# Patient Record
Sex: MALE | Race: WHITE | NOT HISPANIC OR LATINO | Employment: OTHER | URBAN - METROPOLITAN AREA
[De-identification: names, ages, dates, MRNs, and addresses within clinical notes are randomized per-mention and may not be internally consistent; named-entity substitution may affect disease eponyms.]

---

## 2022-04-18 ENCOUNTER — TELEPHONE (OUTPATIENT)
Dept: NEPHROLOGY | Facility: CLINIC | Age: 62
End: 2022-04-18
Payer: MEDICARE

## 2022-04-18 NOTE — TELEPHONE ENCOUNTER
Tried to call pt but no answer. I left  a detailed voicemail that the Lokelma was being sent into his pharmacy instead of Valtessa. And also left details that it will be taken on M-W-F, and if any questions to call the office.       ----- Message from VLADIMIR Flaherty sent at 4/18/2022  3:11 PM CDT -----  Contact: Chago  I sent another Worlize message.      Please let him know that I sent in Lokelma. (Just a different drug that does the same thing) to take M-W-F; Veltassa wasn't on his formulary.  Lokelma was.     Rosie   ----- Message -----  From: Suzi Bass MA  Sent: 4/14/2022  12:42 PM CDT  To: VLADIMIR Flaherty    The denial is on your clipboard  ----- Message -----  From: Jenifer Casillas  Sent: 4/14/2022  10:08 AM CDT  To: Thierry Aldridge Staff    Type: Needs Medical Advice    Who Called: [facility]  Best Call Back Number: [phone]  Additional  Information: Would like a call back to discuss  pt  Deanna,  medication being denied. Wants to know if  will submit an appeal or prescribe an alternate medication  Please Advise- Thank you

## 2022-04-18 NOTE — PROGRESS NOTES
Received denial from Insurance company for Veltassa, not on formulary, have sent in [facility] to take M-W-F.  Please notify the patient and let him know.

## 2022-04-22 ENCOUNTER — TELEPHONE (OUTPATIENT)
Dept: NEPHROLOGY | Facility: CLINIC | Age: 62
End: 2022-04-22
Payer: MEDICARE

## 2022-04-22 NOTE — TELEPHONE ENCOUNTER
Spoke to the pharmacy , LIANE OchoaP had sent a new RX for Lukelma instead of Valtessa. Per the pharmacy the pt picked the Lukelma up yesterday.     ----- Message from Tammi Cazares sent at 4/20/2022 10:47 AM CDT -----  Contact: pharm  Type: Needs Medical Advice         Who Called: sal Caldwell Call Back Number: [phone]  Additional Information: Requesting a call back regarding  the Valtessa was denied by the insurance and a PA was denied and they are wondering if a appeal was done or if something else will be called in.     Ref# [id number]    [id number] - [place], [place] - [address] AT [address]  [address] [place] [address]      Please Advise- Thank you

## 2022-06-09 RX ORDER — DAPAGLIFLOZIN 10 MG/1
1 TABLET, FILM COATED ORAL DAILY
COMMUNITY
Start: 2022-05-18 | End: 2022-10-20

## 2022-06-09 RX ORDER — EZETIMIBE 10 MG/1
0.5 TABLET ORAL DAILY
COMMUNITY
Start: 2022-03-07

## 2022-06-09 RX ORDER — ESOMEPRAZOLE MAGNESIUM 40 MG/1
40 CAPSULE, DELAYED RELEASE ORAL DAILY PRN
COMMUNITY
End: 2023-06-09

## 2022-06-09 RX ORDER — HYDROCHLOROTHIAZIDE 12.5 MG/1
12.5 TABLET ORAL DAILY
COMMUNITY
Start: 2022-02-12 | End: 2023-09-27

## 2022-06-09 RX ORDER — CLOPIDOGREL BISULFATE 75 MG/1
75 TABLET ORAL DAILY
COMMUNITY
Start: 2022-02-12

## 2022-06-09 RX ORDER — FLASH GLUCOSE SENSOR
KIT MISCELLANEOUS
COMMUNITY
Start: 2022-04-06 | End: 2022-06-16

## 2022-06-09 RX ORDER — INSULIN ASPART 100 [IU]/ML
INJECTION, SOLUTION INTRAVENOUS; SUBCUTANEOUS
COMMUNITY
Start: 2022-03-04 | End: 2022-06-16

## 2022-06-15 PROBLEM — I10 ESSENTIAL HYPERTENSION: Status: ACTIVE | Noted: 2022-06-15

## 2022-06-15 PROBLEM — E87.5 HYPERKALEMIA: Status: ACTIVE | Noted: 2022-06-15

## 2022-06-15 PROBLEM — E10.22 TYPE 1 DIABETES MELLITUS WITH STAGE 4 CHRONIC KIDNEY DISEASE: Status: ACTIVE | Noted: 2022-06-15

## 2022-06-15 PROBLEM — E78.2 HYPERLIPIDEMIA, MIXED: Status: ACTIVE | Noted: 2022-06-15

## 2022-06-15 PROBLEM — N18.4 TYPE 1 DIABETES MELLITUS WITH STAGE 4 CHRONIC KIDNEY DISEASE: Status: ACTIVE | Noted: 2022-06-15

## 2022-06-15 PROBLEM — N18.4 CHRONIC KIDNEY DISEASE, STAGE IV (SEVERE): Status: ACTIVE | Noted: 2022-06-15

## 2022-06-15 PROBLEM — E83.39 HYPERPHOSPHATEMIA: Status: ACTIVE | Noted: 2022-06-15

## 2022-06-15 PROBLEM — R80.1 PERSISTENT PROTEINURIA: Status: ACTIVE | Noted: 2022-06-15

## 2022-06-15 NOTE — PROGRESS NOTES
Pt Name:  Boston Strickland  Pt :  1960  Pt MRN:  [id number]    Date: 2022    Reason for visit:   Boston Strickland is a 61 y.o. c male presenting for CKD follow up  with serum creatinine 2.51, eGFR 26 and Chronic Kidney Disease Stage 4.     Chief Complaint:   The patient denies any complaints today.    HPI:  The patient is being seen today for follow up CKD and the status of his kidney function. Since the last visit, patient reports no health changes.  The patient denies fatigue, weakness, poor appetite, metallic taste, nausea, cramping, chest pain, palpitations, SOB, orthopnea, PND, edema, trouble concentrating, decreased urination.      Home BPs when checked are <130 - 140/80 per patient. The patient is following a low sodium diet. The patient is avoiding NSAIDs. The patient is drinking 64 oz of water daily.     Since last visit on 3/3/22 patient reports no changes in medical history.      History:   Past Medical History:   Diagnosis Date    Chronic kidney disease, unspecified     Coronary artery disease     Diverticulosis     DM type 1 (diabetes mellitus, type 1)     External hemorrhoids     GERD (gastroesophageal reflux disease)     HTN (hypertension)     Mixed hyperlipidemia     Personal history of colonic polyps     ST elevation (STEMI) myocardial infarction of unspecified site     Unspecified cataract     Unspecified hemorrhoids      History reviewed. No pertinent surgical history.  Family History   Problem Relation Age of Onset    Hypertension Mother     Hypertension Father     Cancer Sister     Cancer Paternal Aunt      Social History     Substance and Sexual Activity   Alcohol Use Yes    Alcohol/week: 1.0 standard drink    Types: 1 Glasses of wine per week    Comment: occational 1-2 weekly     Social History     Substance and Sexual Activity   Drug Use Never     Social History     Substance and Sexual Activity   Sexual Activity Yes    Partners: Female     reports being sexually active  and has had partner(s) who are female.  Social History     Tobacco Use   Smoking Status Former Smoker   Smokeless Tobacco Never Used       Allergies:  Review of patient's allergies indicates:  No Known Allergies      Current Outpatient Medications:     amLODIPine (NORVASC) 10 MG tablet, Take 10 mg by mouth once daily at 6am., Disp: , Rfl:     aspirin (ECOTRIN) 81 MG EC tablet, Take 81 mg by mouth once daily., Disp: , Rfl:     clopidogreL (PLAVIX) 75 mg tablet, Take 75 mg by mouth once daily., Disp: , Rfl:     dapagliflozin (FARXIGA) 10 mg tablet, Take 1 tablet by mouth once daily., Disp: , Rfl:     esomeprazole (NEXIUM) 40 MG capsule, Take 40 mg by mouth daily as needed., Disp: , Rfl:     ezetimibe (ZETIA) 10 mg tablet, Take 10 mg by mouth once daily., Disp: , Rfl:     HUMALOG U-100 INSULIN 100 unit/mL injection, , Disp: , Rfl:     hydroCHLOROthiazide (HYDRODIURIL) 12.5 MG Tab, Take 12.5 mg by mouth once daily., Disp: , Rfl:     lisinopriL (PRINIVIL,ZESTRIL) 20 MG tablet, Take 20 mg by mouth once daily., Disp: , Rfl:     metoprolol succinate (TOPROL-XL) 50 MG 24 hr tablet, Take 50 mg by mouth once daily., Disp: , Rfl:     multivitamin (ONE DAILY MULTIVITAMIN) per tablet, Take 1 tablet by mouth once daily., Disp: , Rfl:     rosuvastatin (CRESTOR) 20 MG tablet, Take 20 mg by mouth once daily., Disp: , Rfl:     cholecalciferol, vitamin D3, 125 mcg (5,000 unit) Tab, Take 1 tablet (5,000 Units total) by mouth every other day., Disp: 30 tablet, Rfl: 11    sodium zirconium cyclosilicate (LOKELMA) 10 gram packet, Take 1 packet (10 g total) by mouth once daily. Mix entire contents of packet(s) into drinking glass containing 3 tablespoons of water; stir well and drink immediately. Add water and repeat until no powder remains to receive entire dose., Disp: 30 packet, Rfl: 11    ROS:  Review of Systems   Constitutional: Negative for activity change, appetite change and fatigue.   Eyes: Negative for visual  "disturbance.   Respiratory: Negative for shortness of breath.    Cardiovascular: Negative for chest pain, palpitations and leg swelling.   Gastrointestinal: Negative for abdominal pain, constipation, nausea and vomiting.   Genitourinary: Negative for decreased urine volume, difficulty urinating, dysuria, flank pain, frequency and urgency.   Musculoskeletal: Negative for back pain and joint swelling.   Skin: Negative for color change and rash.   Neurological: Negative for dizziness, weakness, light-headedness and headaches.       Physical Exam:   Vitals:   Vitals:    06/16/22 1035   BP: 130/73   Pulse: 66   Temp: 98.1 °F (36.7 °C)   SpO2: 98%   Weight: 86.4 kg (190 lb 6.4 oz)   Height: 5' 9" (1.753 m)   PainSc: 0-No pain     Body mass index is 28.12 kg/m².    Physical Exam  Vitals reviewed.   Constitutional:       General: He is awake. He is not in acute distress.  HENT:      Head: Normocephalic.      Mouth/Throat:      Mouth: Mucous membranes are moist.   Eyes:      General: No scleral icterus.     Conjunctiva/sclera: Conjunctivae normal.      Pupils: Pupils are equal, round, and reactive to light.   Cardiovascular:      Rate and Rhythm: Normal rate and regular rhythm.      Heart sounds: Normal heart sounds, S1 normal and S2 normal. No murmur heard.  Pulmonary:      Effort: Pulmonary effort is normal.      Breath sounds: Normal breath sounds. No wheezing, rhonchi or rales.   Abdominal:      General: There is no distension.      Palpations: Abdomen is soft. There is no mass.   Musculoskeletal:         General: No swelling.      Right lower leg: No edema.      Left lower leg: No edema.   Skin:     General: Skin is warm and dry.      Nails: There is no clubbing.   Neurological:      General: No focal deficit present.      Mental Status: He is alert and oriented to person, place, and time.   Psychiatric:         Mood and Affect: Mood and affect normal.         Thought Content: Thought content normal.         Labs/Tests " 6/14/22:   Component Ref Range & Units 1 d ago   (6/14/22) 2 mo ago   (3/23/22) 3 mo ago   (3/1/22) 7 mo ago   (10/25/21) 7 mo ago   (10/25/21) 8 mo ago   (9/29/21)   Sodium 136 - 147 mmol/L 139  142  139  136  136  140    Potassium 3.5 - 5.1 mmol/L 5.2 High   5.3 High   5.4 High   5.6 High   5.6 High   5.4 High     Chloride 98 - 107 mmol/L 103  107  105  102  102  107    CO2 20 - 31 mmol/L 27  25  28  24  24  28    Glucose 70 - 99 mg/dL 173 High   187 High   137 High   234 High   234 High   127 High     BUN 9 - 23 mg/dL 51 High   54 High   50 High   35 High   35 High   36 High     Creatinine 0.70 - 1.30 mg/dL 2.51 High   2.32 High   2.28 High   1.94 High   1.94 High   1.94 High     Calcium 8.3 - 10.6 mg/dL 10.4  9.4  10.1  9.5  9.5  9.3    Phosphorus Level 2.4 - 5.1 mg/dL 5.2 High    4.7  3.9   4.4    Albumin Level 3.2 - 4.8 g/dL 4.4  4.1  4.5  4.1  4.1  4.1    eGFR >90 mL/min/1.73m2 26 Low   29 Low   30 Low   36 Low   36 Low   36 Low     eGFR  >90 mL/min/1.73m2 31 Low   34 Low   34 Low         Component Ref Range & Units 1 d ago   (6/14/22) 3 mo ago   (3/1/22) 3 mo ago   (3/1/22) 8 mo ago   (9/29/21) 10 mo ago   (7/21/21) 1 yr ago   (2/19/21) 1 yr ago   (11/13/20)   Protein, Urine mg/dL mg/dL 50   74  66 R  203 R   70 R    Creatinine, Urine mg/dL mg/dL 55.6  66.9  68.1  37.8 R  52.2 R  91.8 R  72.0 R    Urine Protein/Creatinine Ratio <0.2 mg of protein/mg of creatinine 0.9 High    1.1 High           Component Ref Range & Units 1 d ago 3 mo ago 8 mo ago 10 mo ago 2 yr ago   PTH, Intact 15 - 65 pg/mL 48  76 High   63  94 High       Component Ref Range & Units 1 d ago   (6/14/22) 3 mo ago   (3/1/22) 8 mo ago   (9/29/21) 10 mo ago   (7/21/21) 1 yr ago   (5/14/21) 1 yr ago   (11/13/20) 1 yr ago   (11/13/20)   WBC 4.4 - 10.1 K/UL 7.1  7.5  7.2   6.1  7.4  7.4    RBC 4.04 - 5.41 M/UL 4.77  4.72  4.09   4.25  4.52  4.52    Hemoglobin 11.9 - 16.8 g/dL 12.7  12.4  10.5 Low   9.9 Low   11.2 Low   12.1  12.1     Hct 36.3 - 50.6 % 39.9  39.9  34.0 Low    35.7 Low   38.1  38.1    MCV 83.3 - 100.5 fL 83.6  84.5  83.1 Low    84.0  84.3  84.3    MCH 27.6 - 33.5 pg 26.6 Low   26.3 Low   25.7 Low    26.4 Low   26.8 Low   26.8 Low     MCHC 31.2 - 34.9 g/dL 31.8  31.1 Low   30.9 Low    31.4  31.8  31.8    RDW 38.5 - 54.4 fL 45.2  48.4  46.5   44.8  46.3  46.3    Platelets 117 - 369 K/  298  271            3/23/22:    Component   Ref Range & Units 2 mo ago   (3/23/22) 7 mo ago   (10/25/21) 1 yr ago   (5/14/21)    Hemoglobin A1C   <5.7 % 8.7 High   8.6 High  CM  7.4 High             Diagnosis:  1. Type 1 diabetes mellitus with stage 4 chronic kidney disease  Renal Function Panel    Renal Function Panel   2. Essential hypertension  Renal Function Panel    Renal Function Panel   3. Persistent proteinuria  Protein/Creatinine Ratio, Urine    Protein/Creatinine Ratio, Urine   4. Hyperlipidemia, mixed  Renal Function Panel    Renal Function Panel   5. Hyperkalemia  Renal Function Panel    Renal Function Panel   6. Hyperphosphatemia  Vitamin D    Vitamin D   7. Chronic kidney disease, stage IV (severe)  Hemoglobin    Renal Function Panel    PTH, Intact    Protein/Creatinine Ratio, Urine    Vitamin D    Hemoglobin    Renal Function Panel    PTH, Intact    Protein/Creatinine Ratio, Urine    Vitamin D         Plan:  1. Type 1 diabetes mellitus with stage 4 chronic kidney disease - uncontrolled - HgbA1c 8.7% on 3/23/22: Has Insulin Pump - managed by Dr. Moody; Farxiga 10 mg po daily for approximately 1 month    2. Essential hypertension - at goal - (near goal at home 130 - 140/80) - Monitor BP, 2 Gram NA diet, Continue Lisinopril 20 mg po daily, Toprol XL 50 mg po daily, Norvasc 10 mg po daily, Hydrodiuril 12.5 mg po daily    3. Persistent proteinuria - 900 mg - down from 1100 mg - ACE / SGLT2   4. Hyperlipidemia, mixed - Continue Zocor 10 mg po nightly and Crestor 20 mg po nightly    5. Hyperkalemia - mild K 5.2 - increase Lokelma to 10  mg po daily from --    6. Hyperphosphatemia - mild Phos 5.2 - with Calcium of 10.4 - Will decrease Vitamin D 3 5000 units po to every other day.    7. Chronic kidney disease, stage IV (severe) - serum creatinine 2.51 / eGFR 26 - (last ) - Avoid NSAIDS, Assure 64 oz of water daily, Meds per #          My reconciliation of medication should not condone or support use of medications that have been previously prescribed for patients by other providers.  I am only documenting the current medications patients is taking and may change doses, add or discontinue medications based on information provided by the patient.     The patient has been provided with their current level of kidney function including eGFR and creatinine.    We discussed the potential for common complications of CKD including anemia, electrolyte abnormalities, abnormal fluid balance, mineral bone disease and malnutrition.    We discussed strategies to slow the progression of their kidney disease includin. Avoid nephrotoxic agents. Avoid over-the-counter and prescription NSAIDs (Ibuprofren, Motrin, Naproxyn, Aleve, Mobic, Celebrex, Toradol, Advil). All of these can worsen kidney function, elevate BP, cause fluid retention/swelling and elevate potassium. Avoid iodine contrast agents and gadolinium, which can worsen kidney function and/or cause kidney failure. Avoid phosphosoda bowel preps which can worsen kidney function.  2. Work to improve modifiable risk factors. Aim for good control of blood glucose without episodes of hypoglycemia. Notify the provider managing your diabetes if your blood glucose < 60. Aim for good blood pressure control without episodes of hypotension. Call the office if your systolic blood pressure is consistently < 110. Aim for good control of your cholesterol.  ? AIC goal <7.0  ? BP goal <130/80        Keeping these in goal range will help prevent progression of cardiovascular disease            and chronic kidney  disease.    We discussed dietary modifications:  ? Low sodium diet: 2 gm/d or less  ? Limit/avoid high potassium foods  ? Avoid potassium containing salt substitutes  ? Limit/avoid high phosphorus foods  ? Limit daily protein intake to 0.8-1 gm/kg of your ideal body weight.    We discussed lifestyle modifications:  ? Make sure you are drinking plenty of fluids--64 ounces (1/2 gallon) daily  ? Exercise at least 30 minutes 5 x per week (total 150 minutes per week), example brisk walking  ? Achieve and maintain a healthy weight (BMI 20-25)  ? Limit alcohol consumption to <2 drinks per day  ? Stop smoking  ? Make sure you stay current on important vaccines-- pneumonia vaccines (Pneumovax and Prevnar), flu vaccine, Hepatitis B (especially patients nearing renal replacement therapy and planning hemodialysis) and Covid-19 vaccine.     Recommendations:  1. Monitor your BP at home daily and record.  2. Bring readings to your next appt.  3. Call the office if your BP is persistently >130/80.  4. Seek urgent medical attention with signs and symptoms of uremia - extreme weakness, fatigue, confusion, anorexia, metallic taste in mouth, hiccoughs, cramping, itching, chest pain, swelling, or trouble sleeping.    Follow Up:   Follow up in about 3 months (around 9/16/2022).

## 2022-06-16 ENCOUNTER — OFFICE VISIT (OUTPATIENT)
Dept: NEPHROLOGY | Facility: CLINIC | Age: 62
End: 2022-06-16
Payer: MEDICARE

## 2022-06-16 VITALS
TEMPERATURE: 98 F | OXYGEN SATURATION: 98 % | HEIGHT: 69 IN | WEIGHT: 190.38 LBS | HEART RATE: 66 BPM | SYSTOLIC BLOOD PRESSURE: 130 MMHG | DIASTOLIC BLOOD PRESSURE: 73 MMHG | BODY MASS INDEX: 28.2 KG/M2

## 2022-06-16 DIAGNOSIS — E83.39 HYPERPHOSPHATEMIA: ICD-10-CM

## 2022-06-16 DIAGNOSIS — N18.4 CHRONIC KIDNEY DISEASE, STAGE IV (SEVERE): ICD-10-CM

## 2022-06-16 DIAGNOSIS — E87.5 HYPERKALEMIA: ICD-10-CM

## 2022-06-16 DIAGNOSIS — R80.1 PERSISTENT PROTEINURIA: ICD-10-CM

## 2022-06-16 DIAGNOSIS — E10.22 TYPE 1 DIABETES MELLITUS WITH STAGE 4 CHRONIC KIDNEY DISEASE: Primary | ICD-10-CM

## 2022-06-16 DIAGNOSIS — I10 ESSENTIAL HYPERTENSION: ICD-10-CM

## 2022-06-16 DIAGNOSIS — E78.2 HYPERLIPIDEMIA, MIXED: ICD-10-CM

## 2022-06-16 DIAGNOSIS — N18.4 TYPE 1 DIABETES MELLITUS WITH STAGE 4 CHRONIC KIDNEY DISEASE: Primary | ICD-10-CM

## 2022-06-16 PROCEDURE — 3052F PR MOST RECENT HEMOGLOBIN A1C LEVEL 8.0 - < 9.0%: ICD-10-PCS | Mod: CPTII,,, | Performed by: NURSE PRACTITIONER

## 2022-06-16 PROCEDURE — 3078F PR MOST RECENT DIASTOLIC BLOOD PRESSURE < 80 MM HG: ICD-10-PCS | Mod: CPTII,,, | Performed by: NURSE PRACTITIONER

## 2022-06-16 PROCEDURE — 3052F HG A1C>EQUAL 8.0%<EQUAL 9.0%: CPT | Mod: CPTII,,, | Performed by: NURSE PRACTITIONER

## 2022-06-16 PROCEDURE — 3008F BODY MASS INDEX DOCD: CPT | Mod: CPTII,,, | Performed by: NURSE PRACTITIONER

## 2022-06-16 PROCEDURE — 3078F DIAST BP <80 MM HG: CPT | Mod: CPTII,,, | Performed by: NURSE PRACTITIONER

## 2022-06-16 PROCEDURE — 4010F ACE/ARB THERAPY RXD/TAKEN: CPT | Mod: CPTII,,, | Performed by: NURSE PRACTITIONER

## 2022-06-16 PROCEDURE — 1159F PR MEDICATION LIST DOCUMENTED IN MEDICAL RECORD: ICD-10-PCS | Mod: CPTII,,, | Performed by: NURSE PRACTITIONER

## 2022-06-16 PROCEDURE — 99214 PR OFFICE/OUTPT VISIT, EST, LEVL IV, 30-39 MIN: ICD-10-PCS | Mod: ,,, | Performed by: NURSE PRACTITIONER

## 2022-06-16 PROCEDURE — 1160F RVW MEDS BY RX/DR IN RCRD: CPT | Mod: CPTII,,, | Performed by: NURSE PRACTITIONER

## 2022-06-16 PROCEDURE — 3066F NEPHROPATHY DOC TX: CPT | Mod: CPTII,,, | Performed by: NURSE PRACTITIONER

## 2022-06-16 PROCEDURE — 3075F SYST BP GE 130 - 139MM HG: CPT | Mod: CPTII,,, | Performed by: NURSE PRACTITIONER

## 2022-06-16 PROCEDURE — 3066F PR DOCUMENTATION OF TREATMENT FOR NEPHROPATHY: ICD-10-PCS | Mod: CPTII,,, | Performed by: NURSE PRACTITIONER

## 2022-06-16 PROCEDURE — 4010F PR ACE/ARB THEARPY RXD/TAKEN: ICD-10-PCS | Mod: CPTII,,, | Performed by: NURSE PRACTITIONER

## 2022-06-16 PROCEDURE — 1160F PR REVIEW ALL MEDS BY PRESCRIBER/CLIN PHARMACIST DOCUMENTED: ICD-10-PCS | Mod: CPTII,,, | Performed by: NURSE PRACTITIONER

## 2022-06-16 PROCEDURE — 3008F PR BODY MASS INDEX (BMI) DOCUMENTED: ICD-10-PCS | Mod: CPTII,,, | Performed by: NURSE PRACTITIONER

## 2022-06-16 PROCEDURE — 1159F MED LIST DOCD IN RCRD: CPT | Mod: CPTII,,, | Performed by: NURSE PRACTITIONER

## 2022-06-16 PROCEDURE — 99214 OFFICE O/P EST MOD 30 MIN: CPT | Mod: ,,, | Performed by: NURSE PRACTITIONER

## 2022-06-16 PROCEDURE — 3075F PR MOST RECENT SYSTOLIC BLOOD PRESS GE 130-139MM HG: ICD-10-PCS | Mod: CPTII,,, | Performed by: NURSE PRACTITIONER

## 2022-06-16 RX ORDER — LISINOPRIL 20 MG/1
20 TABLET ORAL DAILY
COMMUNITY
Start: 2022-03-03 | End: 2023-09-27

## 2022-06-16 RX ORDER — AMLODIPINE BESYLATE 10 MG/1
10 TABLET ORAL DAILY
COMMUNITY
Start: 2021-07-19

## 2022-06-16 RX ORDER — MULTIVITAMIN
1 TABLET ORAL DAILY
COMMUNITY
End: 2023-09-27

## 2022-06-16 RX ORDER — ACETAMINOPHEN 500 MG
5000 TABLET ORAL EVERY OTHER DAY
Qty: 30 TABLET | Refills: 11 | Status: SHIPPED | OUTPATIENT
Start: 2022-06-16 | End: 2023-09-27

## 2022-06-16 RX ORDER — INSULIN LISPRO 100 [IU]/ML
INJECTION, SOLUTION INTRAVENOUS; SUBCUTANEOUS
COMMUNITY
Start: 2021-12-21

## 2022-06-16 RX ORDER — METOPROLOL SUCCINATE 50 MG/1
50 TABLET, EXTENDED RELEASE ORAL DAILY
COMMUNITY
Start: 2022-03-03 | End: 2022-10-20

## 2022-06-16 RX ORDER — ASPIRIN 81 MG/1
81 TABLET ORAL DAILY
COMMUNITY

## 2022-06-16 RX ORDER — ROSUVASTATIN CALCIUM 20 MG/1
20 TABLET, COATED ORAL DAILY
COMMUNITY
Start: 2022-03-10 | End: 2023-09-27

## 2022-06-16 RX ORDER — ACETAMINOPHEN 500 MG
5000 TABLET ORAL DAILY
COMMUNITY
End: 2022-06-16 | Stop reason: SDUPTHER

## 2022-10-19 PROBLEM — I12.9 HYPERTENSION WITH IMPAIRED RENAL FUNCTION: Status: ACTIVE | Noted: 2022-10-19

## 2022-10-19 PROBLEM — R80.9 NON-NEPHROTIC RANGE PROTEINURIA: Status: ACTIVE | Noted: 2022-10-19

## 2022-10-19 PROBLEM — E10.21 TYPE 1 DIABETES MELLITUS WITH NEPHROPATHY: Status: ACTIVE | Noted: 2022-10-19

## 2022-10-19 PROBLEM — N18.32 STAGE 3B CHRONIC KIDNEY DISEASE: Status: ACTIVE | Noted: 2022-10-19

## 2022-10-19 PROBLEM — N18.4 CHRONIC KIDNEY DISEASE, STAGE IV (SEVERE): Status: RESOLVED | Noted: 2022-06-15 | Resolved: 2022-10-19

## 2022-10-19 PROBLEM — N18.32 ANEMIA IN STAGE 3B CHRONIC KIDNEY DISEASE: Status: ACTIVE | Noted: 2022-10-19

## 2022-10-19 PROBLEM — D63.1 ANEMIA IN STAGE 3B CHRONIC KIDNEY DISEASE: Status: ACTIVE | Noted: 2022-10-19

## 2022-10-19 PROBLEM — N25.81 SECONDARY HYPERPARATHYROIDISM OF RENAL ORIGIN: Status: ACTIVE | Noted: 2022-10-19

## 2022-10-19 NOTE — PROGRESS NOTES
Pt Name:  Boston Strickland  Pt :  1960  Pt MRN:  [id number]    Date: 10/20/2022    Reason for visit:   Boston Strickland is a 62 y.o. c male presenting for CKD follow up with serum creatinine 2.03, eGFR 34 and Chronic Kidney Disease Stage 3b.     Chief Complaint:   The patient denies any complaints today.    HPI:  The patient is being seen today for follow up CKD and the status of his kidney function. Since the last visit, patient reports Dr. Parikh stopped the Farxiga - due to concerns about Ketoacidosis. This was approximately 3 weeks ago.  The patient denies fatigue, weakness, poor appetite, metallic taste, nausea, cramping, chest pain, palpitations, SOB, orthopnea, PND, edema, trouble concentrating, decreased urination.      Home BPs when checked are <130/80 per patient. The patient is following a low sodium diet. The patient is avoiding NSAIDs. The patient is drinking 64 oz of water daily.     Since last visit on 22 patient reports above noted changes in medical history.      History:   Past Medical History:   Diagnosis Date    Chronic kidney disease, unspecified     Coronary artery disease     Diverticulosis     DM type 1 (diabetes mellitus, type 1)     External hemorrhoids     GERD (gastroesophageal reflux disease)     HTN (hypertension)     Mixed hyperlipidemia     Personal history of colonic polyps     ST elevation (STEMI) myocardial infarction of unspecified site     Unspecified cataract     Unspecified hemorrhoids      History reviewed. No pertinent surgical history.  Family History   Problem Relation Age of Onset    Hypertension Mother     Hypertension Father     Cancer Sister     Cancer Paternal Aunt      Social History     Substance and Sexual Activity   Alcohol Use Yes    Alcohol/week: 1.0 standard drink    Types: 1 Glasses of wine per week    Comment: occational 1-2 weekly     Social History     Substance and Sexual Activity   Drug Use Never     Social History     Substance and Sexual Activity    Sexual Activity Yes    Partners: Female     reports being sexually active and has had partner(s) who are female.  Social History     Tobacco Use   Smoking Status Former   Smokeless Tobacco Never       Allergies:  Review of patient's allergies indicates:  No Known Allergies      Current Outpatient Medications:     amLODIPine (NORVASC) 10 MG tablet, Take 10 mg by mouth once daily at 6am., Disp: , Rfl:     aspirin (ECOTRIN) 81 MG EC tablet, Take 81 mg by mouth once daily., Disp: , Rfl:     carvediloL (COREG) 25 MG tablet, Take 25 mg by mouth 2 (two) times daily., Disp: , Rfl:     cholecalciferol, vitamin D3, 125 mcg (5,000 unit) Tab, Take 1 tablet (5,000 Units total) by mouth every other day., Disp: 30 tablet, Rfl: 11    clopidogreL (PLAVIX) 75 mg tablet, Take 75 mg by mouth once daily., Disp: , Rfl:     esomeprazole (NEXIUM) 40 MG capsule, Take 40 mg by mouth daily as needed., Disp: , Rfl:     ezetimibe (ZETIA) 10 mg tablet, Take 10 mg by mouth once daily., Disp: , Rfl:     HUMALOG U-100 INSULIN 100 unit/mL injection, as needed. SS, Disp: , Rfl:     hydroCHLOROthiazide (HYDRODIURIL) 12.5 MG Tab, Take 12.5 mg by mouth once daily., Disp: , Rfl:     lisinopriL (PRINIVIL,ZESTRIL) 20 MG tablet, Take 20 mg by mouth once daily., Disp: , Rfl:     magnesium oxide (MAG-OX) 400 mg (241.3 mg magnesium) tablet, Take 400 mg by mouth., Disp: , Rfl:     multivitamin (THERAGRAN) per tablet, Take 1 tablet by mouth once daily., Disp: , Rfl:     mv-mn/iron/folic acid/herb 190 (VITAMIN D3 COMPLETE ORAL), , Disp: , Rfl:     rosuvastatin (CRESTOR) 20 MG tablet, Take 20 mg by mouth once daily., Disp: , Rfl:     sodium zirconium cyclosilicate (LOKELMA) 10 gram packet, Take 1 packet (10 g total) by mouth once daily. Mix entire contents of packet(s) into drinking glass containing 3 tablespoons of water; stir well and drink immediately. Add water and repeat until no powder remains to receive entire dose., Disp: 30 packet, Rfl: 11    VITAMIN B  "COMPLEX ORAL, Take 1 tablet by mouth once daily., Disp: , Rfl:     ROS:  Review of Systems   Constitutional:  Negative for activity change, appetite change and fatigue.   Eyes:  Negative for visual disturbance.   Respiratory:  Negative for shortness of breath.    Cardiovascular:  Negative for chest pain, palpitations and leg swelling.   Gastrointestinal:  Negative for abdominal pain, constipation, nausea and vomiting.   Genitourinary:  Negative for decreased urine volume, difficulty urinating, dysuria, flank pain, frequency and urgency.   Musculoskeletal:  Negative for back pain and joint swelling.   Skin:  Negative for color change and rash.   Neurological:  Negative for dizziness, weakness, light-headedness and headaches.     Physical Exam:   Vitals:   Vitals:    10/20/22 1109   BP: 137/66   Pulse: 70   SpO2: 98%   Weight: 86.5 kg (190 lb 12.8 oz)   Height: 5' 9" (1.753 m)   PainSc: 0-No pain     Body mass index is 28.18 kg/m².    Physical Exam  Vitals reviewed.   Constitutional:       General: He is awake. He is not in acute distress.  HENT:      Head: Normocephalic.      Mouth/Throat:      Mouth: Mucous membranes are moist.   Eyes:      General: No scleral icterus.     Conjunctiva/sclera: Conjunctivae normal.      Pupils: Pupils are equal, round, and reactive to light.   Cardiovascular:      Rate and Rhythm: Normal rate and regular rhythm.      Heart sounds: Normal heart sounds, S1 normal and S2 normal. No murmur heard.  Pulmonary:      Effort: Pulmonary effort is normal.      Breath sounds: Normal breath sounds. No wheezing, rhonchi or rales.   Abdominal:      General: There is no distension.      Palpations: Abdomen is soft. There is no mass.   Musculoskeletal:         General: No swelling.      Right lower leg: No edema.      Left lower leg: No edema.   Skin:     General: Skin is warm and dry.      Nails: There is no clubbing.   Neurological:      General: No focal deficit present.      Mental Status: He is " alert and oriented to person, place, and time.   Psychiatric:         Mood and Affect: Mood and affect normal.         Thought Content: Thought content normal.       Labs/Tests:  Lab Results   Component Value Date     10/18/2022    K 4.1 10/18/2022     10/18/2022    CO2 27 10/18/2022    BUN 41 (H) 10/18/2022    CREATININE 2.03 (H) 10/18/2022    CREATININE 2.39 (H) 09/27/2022    CREATININE 2.51 (H) 06/14/2022    GLUCOSE 107 (H) 10/18/2022    CALCIUM 9.1 10/18/2022    PHOSPHORUS 4.4 10/18/2022    ALBUMIN 3.9 10/18/2022    EGFRNONAA 34 (L) 10/18/2022    EGFRNONAA 28 (L) 09/27/2022    EGFRNONAA 26 (L) 06/14/2022    ESTGFRAFRICA 39 (L) 10/18/2022    ESTGFRAFRICA 32 (L) 09/27/2022    ESTGFRAFRICA 31 (L) 06/14/2022     (H) 10/18/2022    HGB 11.2 (L) 10/18/2022    HGB 12.7 06/14/2022    HGB 12.4 03/01/2022    HGBA1C 7.4 (H) 09/27/2022    IRON 47 (L) 09/29/2021    TIBC 322 09/29/2021    FERRITIN 26.7 09/29/2021    HEPBSAG NON-REACTIVE 07/21/2021    TRIG 92 10/25/2021    CHOL 135 10/25/2021    HDL 54 10/25/2021    LDLCALC 63 10/25/2021    LABVLDL 18 10/25/2021    QOVYZQWM11GE 70.1 10/18/2022       Component Ref Range & Units 1 d ago   (10/18/22) 4 mo ago   (6/14/22)   Protein, Urine mg/dL 43  50    Creatinine, Urine mg/dL 81.1  55.6    Urine Protein/Creatinine Ratio <0.2 mg of protein/mg of creatinine 0.5 High   0.9 High           Diagnosis:  1. Type 1 diabetes mellitus with nephropathy  Renal Function Panel    Protein/Creatinine Ratio, Urine    Renal Function Panel    Protein/Creatinine Ratio, Urine      2. Hypertension with impaired renal function  Renal Function Panel    Renal Function Panel      3. Stage 3b chronic kidney disease  Hemoglobin    Vitamin D    Renal Function Panel    PTH, Intact    Protein/Creatinine Ratio, Urine    Hemoglobin    Vitamin D    Renal Function Panel    PTH, Intact    Protein/Creatinine Ratio, Urine      4. Hyperkalemia  Renal Function Panel    Renal Function Panel      5.  Secondary hyperparathyroidism of renal origin  Vitamin D    PTH, Intact    Vitamin D    PTH, Intact      6. Anemia in stage 3b chronic kidney disease  Hemoglobin    Hemoglobin      7. Non-nephrotic range proteinuria  Protein/Creatinine Ratio, Urine    Protein/Creatinine Ratio, Urine      8. Hyperlipidemia, mixed             Plan:  Type 1 diabetes mellitus with nephropathy  Well controlled with reported HgbA1c of 7.4% from within the last 3 weeks - Insulin Pump - Continue Humalog     Hypertension with impaired renal function  At goal, Monitor BP, 2 Gram NA diet, Continue Amlodipine 10 mg po daily, Coreg 25 mg po BID, HCTZ 12.5 mg po daily, Lisinopril 20 mg po daily     Stage 3b chronic kidney disease  Serum Creatinine 2.03 / eGFR 34; Avoid NSAIDS, Assure 64 oz of water daily, Meds per med list above     Hyperkalemia  K 4.1 - Continue Lokelma 10 gr po daily     Secondary hyperparathyroidism of renal origin   - No indication for specialized Vitamin D at this time.     Anemia in stage 3b chronic kidney disease  Hgb 11.2 - No indication for ASHVIN therapy at this time.     Non-nephrotic range proteinuria  500 mg - down from 900 mg - Continue ACE and discuss with PCP about restarting SGLT2 - I have recommended that he restart the Farxiga     Hyperlipidemia, mixed  Continue Zetia 10 mg po daily, Crestor 20 mg po daily        My reconciliation of medication should not condone or support use of medications that have been previously prescribed for patients by other providers.  I am only documenting the current medications patients is taking and may change doses, add or discontinue medications based on information provided by the patient.     The patient has been provided with their current level of kidney function including eGFR and creatinine.    We discussed the potential for common complications of CKD including anemia, electrolyte abnormalities, abnormal fluid balance, mineral bone disease and malnutrition.    We  discussed strategies to slow the progression of their kidney disease including:  Avoid nephrotoxic agents. Avoid over-the-counter and prescription NSAIDs (Ibuprofren, Motrin, Naproxyn, Aleve, Mobic, Celebrex, Toradol, Advil). All of these can worsen kidney function, elevate BP, cause fluid retention/swelling and elevate potassium. Avoid iodine contrast agents and gadolinium, which can worsen kidney function and/or cause kidney failure. Avoid phosphosoda bowel preps which can worsen kidney function.  Work to improve modifiable risk factors. Aim for good control of blood glucose without episodes of hypoglycemia. Notify the provider managing your diabetes if your blood glucose < 60. Aim for good blood pressure control without episodes of hypotension. Call the office if your systolic blood pressure is consistently < 110. Aim for good control of your cholesterol.  AIC goal <7.0  BP goal <130/80        Keeping these in goal range will help prevent progression of cardiovascular disease            and chronic kidney disease.    We discussed dietary modifications:  Low sodium diet: 2 gm/d or less  Limit/avoid high potassium foods  Avoid potassium containing salt substitutes  Limit/avoid high phosphorus foods  Limit daily protein intake to 0.8-1 gm/kg of your ideal body weight.    We discussed lifestyle modifications:  Make sure you are drinking plenty of fluids--64 ounces (1/2 gallon) daily  Exercise at least 30 minutes 5 x per week (total 150 minutes per week), example brisk walking  Achieve and maintain a healthy weight (BMI 20-25)  Limit alcohol consumption to <2 drinks per day  Stop smoking  Make sure you stay current on important vaccines-- pneumonia vaccines (Pneumovax and Prevnar), flu vaccine, Hepatitis B (especially patients nearing renal replacement therapy and planning hemodialysis) and Covid-19 vaccine.     Recommendations:  Monitor your BP at home daily and record.  Bring readings to your next appt.  Call the  office if your BP is persistently >130/80.  Seek urgent medical attention with signs and symptoms of uremia - extreme weakness, fatigue, confusion, anorexia, metallic taste in mouth, hiccoughs, cramping, itching, chest pain, swelling, or trouble sleeping.    Follow Up:   Follow up in about 4 months (around 2/20/2023).

## 2022-10-19 NOTE — ASSESSMENT & PLAN NOTE
At goal, Monitor BP, 2 Gram NA diet, Continue Amlodipine 10 mg po daily, Coreg 25 mg po BID, HCTZ 12.5 mg po daily, Lisinopril 20 mg po daily

## 2022-10-19 NOTE — ASSESSMENT & PLAN NOTE
Serum Creatinine 2.03 / eGFR 34; Avoid NSAIDS, Assure 64 oz of water daily, Meds per med list above

## 2022-10-19 NOTE — ASSESSMENT & PLAN NOTE
Well controlled with reported HgbA1c of 7.4% from within the last 3 weeks - Insulin Pump - Continue Humalog

## 2022-10-19 NOTE — ASSESSMENT & PLAN NOTE
500 mg - down from 900 mg - Continue ACE and discuss with PCP about restarting SGLT2 - I have recommended that he restart the Farxiga

## 2022-10-20 ENCOUNTER — OFFICE VISIT (OUTPATIENT)
Dept: NEPHROLOGY | Facility: CLINIC | Age: 62
End: 2022-10-20
Payer: MEDICARE

## 2022-10-20 VITALS
OXYGEN SATURATION: 98 % | BODY MASS INDEX: 28.26 KG/M2 | SYSTOLIC BLOOD PRESSURE: 137 MMHG | HEIGHT: 69 IN | HEART RATE: 70 BPM | WEIGHT: 190.81 LBS | DIASTOLIC BLOOD PRESSURE: 66 MMHG

## 2022-10-20 DIAGNOSIS — N18.32 ANEMIA IN STAGE 3B CHRONIC KIDNEY DISEASE: ICD-10-CM

## 2022-10-20 DIAGNOSIS — R80.9 NON-NEPHROTIC RANGE PROTEINURIA: ICD-10-CM

## 2022-10-20 DIAGNOSIS — E10.21 TYPE 1 DIABETES MELLITUS WITH NEPHROPATHY: Primary | ICD-10-CM

## 2022-10-20 DIAGNOSIS — I12.9 HYPERTENSION WITH IMPAIRED RENAL FUNCTION: ICD-10-CM

## 2022-10-20 DIAGNOSIS — E87.5 HYPERKALEMIA: ICD-10-CM

## 2022-10-20 DIAGNOSIS — N18.32 STAGE 3B CHRONIC KIDNEY DISEASE: ICD-10-CM

## 2022-10-20 DIAGNOSIS — E78.2 HYPERLIPIDEMIA, MIXED: ICD-10-CM

## 2022-10-20 DIAGNOSIS — D63.1 ANEMIA IN STAGE 3B CHRONIC KIDNEY DISEASE: ICD-10-CM

## 2022-10-20 DIAGNOSIS — N25.81 SECONDARY HYPERPARATHYROIDISM OF RENAL ORIGIN: ICD-10-CM

## 2022-10-20 PROCEDURE — 4010F PR ACE/ARB THEARPY RXD/TAKEN: ICD-10-PCS | Mod: CPTII,,, | Performed by: NURSE PRACTITIONER

## 2022-10-20 PROCEDURE — 1159F PR MEDICATION LIST DOCUMENTED IN MEDICAL RECORD: ICD-10-PCS | Mod: CPTII,,, | Performed by: NURSE PRACTITIONER

## 2022-10-20 PROCEDURE — 3066F PR DOCUMENTATION OF TREATMENT FOR NEPHROPATHY: ICD-10-PCS | Mod: CPTII,,, | Performed by: NURSE PRACTITIONER

## 2022-10-20 PROCEDURE — 99213 PR OFFICE/OUTPT VISIT, EST, LEVL III, 20-29 MIN: ICD-10-PCS | Mod: ,,, | Performed by: NURSE PRACTITIONER

## 2022-10-20 PROCEDURE — 3066F NEPHROPATHY DOC TX: CPT | Mod: CPTII,,, | Performed by: NURSE PRACTITIONER

## 2022-10-20 PROCEDURE — 3075F PR MOST RECENT SYSTOLIC BLOOD PRESS GE 130-139MM HG: ICD-10-PCS | Mod: CPTII,,, | Performed by: NURSE PRACTITIONER

## 2022-10-20 PROCEDURE — 3078F DIAST BP <80 MM HG: CPT | Mod: CPTII,,, | Performed by: NURSE PRACTITIONER

## 2022-10-20 PROCEDURE — 3078F PR MOST RECENT DIASTOLIC BLOOD PRESSURE < 80 MM HG: ICD-10-PCS | Mod: CPTII,,, | Performed by: NURSE PRACTITIONER

## 2022-10-20 PROCEDURE — 99213 OFFICE O/P EST LOW 20 MIN: CPT | Mod: ,,, | Performed by: NURSE PRACTITIONER

## 2022-10-20 PROCEDURE — 1159F MED LIST DOCD IN RCRD: CPT | Mod: CPTII,,, | Performed by: NURSE PRACTITIONER

## 2022-10-20 PROCEDURE — 4010F ACE/ARB THERAPY RXD/TAKEN: CPT | Mod: CPTII,,, | Performed by: NURSE PRACTITIONER

## 2022-10-20 PROCEDURE — 1160F RVW MEDS BY RX/DR IN RCRD: CPT | Mod: CPTII,,, | Performed by: NURSE PRACTITIONER

## 2022-10-20 PROCEDURE — 1160F PR REVIEW ALL MEDS BY PRESCRIBER/CLIN PHARMACIST DOCUMENTED: ICD-10-PCS | Mod: CPTII,,, | Performed by: NURSE PRACTITIONER

## 2022-10-20 PROCEDURE — 3075F SYST BP GE 130 - 139MM HG: CPT | Mod: CPTII,,, | Performed by: NURSE PRACTITIONER

## 2022-10-20 RX ORDER — LANOLIN ALCOHOL/MO/W.PET/CERES
400 CREAM (GRAM) TOPICAL DAILY
COMMUNITY

## 2022-10-20 RX ORDER — CARVEDILOL 12.5 MG/1
12.5 TABLET ORAL DAILY
COMMUNITY
Start: 2022-10-03 | End: 2023-10-03

## 2023-03-03 ENCOUNTER — OFFICE VISIT (OUTPATIENT)
Dept: NEPHROLOGY | Facility: CLINIC | Age: 63
End: 2023-03-03
Payer: MEDICARE

## 2023-03-03 VITALS
DIASTOLIC BLOOD PRESSURE: 65 MMHG | SYSTOLIC BLOOD PRESSURE: 126 MMHG | HEIGHT: 69 IN | BODY MASS INDEX: 26.81 KG/M2 | OXYGEN SATURATION: 98 % | WEIGHT: 181 LBS | HEART RATE: 76 BPM

## 2023-03-03 DIAGNOSIS — I12.9 HYPERTENSION WITH IMPAIRED RENAL FUNCTION: ICD-10-CM

## 2023-03-03 DIAGNOSIS — E78.2 HYPERLIPIDEMIA, MIXED: ICD-10-CM

## 2023-03-03 DIAGNOSIS — E10.21 TYPE 1 DIABETES MELLITUS WITH NEPHROPATHY: ICD-10-CM

## 2023-03-03 DIAGNOSIS — N25.81 SECONDARY HYPERPARATHYROIDISM OF RENAL ORIGIN: ICD-10-CM

## 2023-03-03 DIAGNOSIS — R80.9 NON-NEPHROTIC RANGE PROTEINURIA: ICD-10-CM

## 2023-03-03 DIAGNOSIS — E87.5 HYPERKALEMIA: ICD-10-CM

## 2023-03-03 DIAGNOSIS — N18.32 STAGE 3B CHRONIC KIDNEY DISEASE: ICD-10-CM

## 2023-03-03 PROBLEM — D63.1 ANEMIA IN STAGE 3B CHRONIC KIDNEY DISEASE: Status: RESOLVED | Noted: 2022-10-19 | Resolved: 2023-03-03

## 2023-03-03 PROBLEM — N18.4 TYPE 1 DIABETES MELLITUS WITH STAGE 4 CHRONIC KIDNEY DISEASE: Status: RESOLVED | Noted: 2022-06-15 | Resolved: 2023-03-03

## 2023-03-03 PROBLEM — I10 ESSENTIAL HYPERTENSION: Status: RESOLVED | Noted: 2022-06-15 | Resolved: 2023-03-03

## 2023-03-03 PROBLEM — E83.39 HYPERPHOSPHATEMIA: Status: RESOLVED | Noted: 2022-06-15 | Resolved: 2023-03-03

## 2023-03-03 PROBLEM — E10.22 TYPE 1 DIABETES MELLITUS WITH STAGE 4 CHRONIC KIDNEY DISEASE: Status: RESOLVED | Noted: 2022-06-15 | Resolved: 2023-03-03

## 2023-03-03 PROBLEM — R80.1 PERSISTENT PROTEINURIA: Status: RESOLVED | Noted: 2022-06-15 | Resolved: 2023-03-03

## 2023-03-03 PROCEDURE — 99213 PR OFFICE/OUTPT VISIT, EST, LEVL III, 20-29 MIN: ICD-10-PCS | Mod: ,,, | Performed by: NURSE PRACTITIONER

## 2023-03-03 PROCEDURE — 3074F PR MOST RECENT SYSTOLIC BLOOD PRESSURE < 130 MM HG: ICD-10-PCS | Mod: CPTII,,, | Performed by: NURSE PRACTITIONER

## 2023-03-03 PROCEDURE — 3078F PR MOST RECENT DIASTOLIC BLOOD PRESSURE < 80 MM HG: ICD-10-PCS | Mod: CPTII,,, | Performed by: NURSE PRACTITIONER

## 2023-03-03 PROCEDURE — 3008F BODY MASS INDEX DOCD: CPT | Mod: CPTII,,, | Performed by: NURSE PRACTITIONER

## 2023-03-03 PROCEDURE — 3066F NEPHROPATHY DOC TX: CPT | Mod: CPTII,,, | Performed by: NURSE PRACTITIONER

## 2023-03-03 PROCEDURE — 1159F MED LIST DOCD IN RCRD: CPT | Mod: CPTII,,, | Performed by: NURSE PRACTITIONER

## 2023-03-03 PROCEDURE — 3066F PR DOCUMENTATION OF TREATMENT FOR NEPHROPATHY: ICD-10-PCS | Mod: CPTII,,, | Performed by: NURSE PRACTITIONER

## 2023-03-03 PROCEDURE — 3074F SYST BP LT 130 MM HG: CPT | Mod: CPTII,,, | Performed by: NURSE PRACTITIONER

## 2023-03-03 PROCEDURE — 3008F PR BODY MASS INDEX (BMI) DOCUMENTED: ICD-10-PCS | Mod: CPTII,,, | Performed by: NURSE PRACTITIONER

## 2023-03-03 PROCEDURE — 1160F RVW MEDS BY RX/DR IN RCRD: CPT | Mod: CPTII,,, | Performed by: NURSE PRACTITIONER

## 2023-03-03 PROCEDURE — 3078F DIAST BP <80 MM HG: CPT | Mod: CPTII,,, | Performed by: NURSE PRACTITIONER

## 2023-03-03 PROCEDURE — 99213 OFFICE O/P EST LOW 20 MIN: CPT | Mod: ,,, | Performed by: NURSE PRACTITIONER

## 2023-03-03 PROCEDURE — 1159F PR MEDICATION LIST DOCUMENTED IN MEDICAL RECORD: ICD-10-PCS | Mod: CPTII,,, | Performed by: NURSE PRACTITIONER

## 2023-03-03 PROCEDURE — 1160F PR REVIEW ALL MEDS BY PRESCRIBER/CLIN PHARMACIST DOCUMENTED: ICD-10-PCS | Mod: CPTII,,, | Performed by: NURSE PRACTITIONER

## 2023-03-03 RX ORDER — INSULIN ASPART 100 [IU]/ML
INJECTION, SOLUTION INTRAVENOUS; SUBCUTANEOUS
COMMUNITY
Start: 2023-02-02

## 2023-03-03 RX ORDER — DAPAGLIFLOZIN 10 MG/1
10 TABLET, FILM COATED ORAL DAILY
COMMUNITY
Start: 2023-02-21

## 2023-03-03 NOTE — ASSESSMENT & PLAN NOTE
Serum Creatinine 2.19 / eGFR 33 (2.03 / eGFR 34 on 10/18/22) - without Anemia -  Avoid NSAIDS, Assure 64 oz of water daily, Meds per med list above

## 2023-03-03 NOTE — PROGRESS NOTES
Pt Name:  Boston Strickland  Pt :  1960  Pt MRN:  [id number]    Date: 3/3/2023    Reason for visit:   Boston Strickland is a 62 y.o. c male presenting for CKD follow up with serum creatinine 2.19, eGFR 33 and Chronic Kidney Disease Stage 3b.     Chief Complaint:   The patient denies any complaints today.    HPI:  The patient is being seen today for follow up CKD and the status of his kidney function. Since the last visit, patient reports no health changes.  The patient denies fatigue, weakness, poor appetite, metallic taste, nausea, cramping, chest pain, palpitations, SOB, orthopnea, PND, edema, trouble concentrating, decreased urination.      Home BPs when checked are <130/80 per patient. The patient is following a low sodium diet. The patient is avoiding NSAIDs. The patient is drinking 64 +  oz of water daily.     Since last visit on 10/20/22 patient reports no changes in medical history.      History:   Past Medical History:   Diagnosis Date    Anemia in stage 3b chronic kidney disease 10/19/2022    Chronic kidney disease, unspecified     Coronary artery disease     Diverticulosis     DM type 1 (diabetes mellitus, type 1)     Essential hypertension 6/15/2022    External hemorrhoids     GERD (gastroesophageal reflux disease)     HTN (hypertension)     Hyperphosphatemia 6/15/2022    Mixed hyperlipidemia     Persistent proteinuria 6/15/2022    Personal history of colonic polyps     ST elevation (STEMI) myocardial infarction of unspecified site     Type 1 diabetes mellitus with stage 4 chronic kidney disease 6/15/2022    Unspecified cataract     Unspecified hemorrhoids      History reviewed. No pertinent surgical history.  Family History   Problem Relation Age of Onset    Hypertension Mother     Hypertension Father     Cancer Sister     Cancer Paternal Aunt      Social History     Substance and Sexual Activity   Alcohol Use Yes    Alcohol/week: 1.0 standard drink    Types: 1 Glasses of wine per week    Comment:  occational 1-2 weekly     Social History     Substance and Sexual Activity   Drug Use Never     Social History     Substance and Sexual Activity   Sexual Activity Yes    Partners: Female     reports being sexually active and has had partner(s) who are female.  Social History     Tobacco Use   Smoking Status Former   Smokeless Tobacco Never       Allergies:  Review of patient's allergies indicates:  No Known Allergies      Current Outpatient Medications:     amLODIPine (NORVASC) 10 MG tablet, Take 10 mg by mouth once daily., Disp: , Rfl:     aspirin (ECOTRIN) 81 MG EC tablet, Take 81 mg by mouth once daily., Disp: , Rfl:     carvediloL (COREG) 12.5 MG tablet, Take 12.5 mg by mouth 2 (two) times daily., Disp: , Rfl:     cholecalciferol, vitamin D3, 125 mcg (5,000 unit) Tab, Take 1 tablet (5,000 Units total) by mouth every other day., Disp: 30 tablet, Rfl: 11    clopidogreL (PLAVIX) 75 mg tablet, Take 75 mg by mouth once daily., Disp: , Rfl:     esomeprazole (NEXIUM) 40 MG capsule, Take 40 mg by mouth daily as needed., Disp: , Rfl:     ezetimibe (ZETIA) 10 mg tablet, Take 10 mg by mouth once daily., Disp: , Rfl:     FARXIGA 10 mg tablet, Take 10 mg by mouth Daily., Disp: , Rfl:     fish oil/borage/flax/om3,6,9 1 (OMEGA 3-6-9 ORAL), Take 1 tablet by mouth Daily., Disp: , Rfl:     HUMALOG U-100 INSULIN 100 unit/mL injection, as needed. SS, Disp: , Rfl:     hydroCHLOROthiazide (HYDRODIURIL) 12.5 MG Tab, Take 12.5 mg by mouth once daily., Disp: , Rfl:     insulin aspart U-100 (NOVOLOG) 100 unit/mL injection, Takes on a SS when out of humalog, Disp: , Rfl:     lisinopriL (PRINIVIL,ZESTRIL) 20 MG tablet, Take 20 mg by mouth once daily., Disp: , Rfl:     magnesium oxide (MAG-OX) 400 mg (241.3 mg magnesium) tablet, Take 400 mg by mouth once daily., Disp: , Rfl:     multivitamin (THERAGRAN) per tablet, Take 1 tablet by mouth once daily., Disp: , Rfl:     rosuvastatin (CRESTOR) 20 MG tablet, Take 20 mg by mouth once daily.,  "Disp: , Rfl:     sodium zirconium cyclosilicate (LOKELMA) 10 gram packet, Take 1 packet (10 g total) by mouth once daily. Mix entire contents of packet(s) into drinking glass containing 3 tablespoons of water; stir well and drink immediately. Add water and repeat until no powder remains to receive entire dose., Disp: 30 packet, Rfl: 11    VITAMIN B COMPLEX ORAL, Take 1 tablet by mouth once daily., Disp: , Rfl:     ROS:  Review of Systems   Constitutional:  Negative for activity change, appetite change and fatigue.   Eyes:  Negative for visual disturbance.   Respiratory:  Negative for shortness of breath.    Cardiovascular:  Negative for chest pain, palpitations and leg swelling.   Gastrointestinal:  Negative for abdominal pain, constipation, nausea and vomiting.   Genitourinary:  Negative for decreased urine volume, difficulty urinating, dysuria, flank pain, frequency and urgency.   Musculoskeletal:  Negative for back pain and joint swelling.   Skin:  Negative for color change and rash.   Neurological:  Negative for dizziness, weakness, light-headedness and headaches.     Physical Exam:   Vitals:   Vitals:    03/03/23 1609   BP: 126/65   Pulse: 76   SpO2: 98%   Weight: 82.1 kg (181 lb)   Height: 5' 9" (1.753 m)     Body mass index is 26.73 kg/m².    Physical Exam  Vitals reviewed.   Constitutional:       General: He is awake. He is not in acute distress.  HENT:      Head: Normocephalic.      Mouth/Throat:      Mouth: Mucous membranes are moist.   Eyes:      General: No scleral icterus.     Conjunctiva/sclera: Conjunctivae normal.      Pupils: Pupils are equal, round, and reactive to light.   Cardiovascular:      Rate and Rhythm: Normal rate and regular rhythm.      Heart sounds: S1 normal and S2 normal. Murmur heard.   Systolic murmur is present with a grade of 1/6.   Pulmonary:      Effort: Pulmonary effort is normal.      Breath sounds: Normal breath sounds. No wheezing, rhonchi or rales.   Abdominal:      " General: There is no distension.      Palpations: Abdomen is soft. There is no mass.   Musculoskeletal:         General: No swelling.      Right lower leg: No edema.      Left lower leg: No edema.   Skin:     General: Skin is warm and dry.      Nails: There is no clubbing.   Neurological:      General: No focal deficit present.      Mental Status: He is alert and oriented to person, place, and time.   Psychiatric:         Mood and Affect: Mood and affect normal.         Thought Content: Thought content normal.       Labs/Tests:    Contains abnormal data RENAL FUNCTION PANEL     Ref Range & Units 3 d ago   Sodium 136 - 147 mmol/L 140    Potassium 3.5 - 5.1 mmol/L 5.4 High     Chloride 98 - 107 mmol/L 105    CO2 20 - 31 mmol/L 29    Glucose 70 - 99 mg/dL 168 High     BUN 9 - 23 mg/dL 33 High     Creatinine 0.70 - 1.30 mg/dL 2.19 High     Calcium 8.3 - 10.6 mg/dL 9.4    Phosphorus Level 2.4 - 5.1 mg/dL 4.1    Albumin Level 3.2 - 4.8 g/dL 4.3    Anion Gap 5.0 - 15.0 mmol/L 6.2    eGFR CKD-EPI >90 ml/min/1.73m2 33 Low      Lab Results   Component Value Date     (H) 02/28/2023    HGB 12.5 02/28/2023    HGB 12.8 02/22/2023    HGB 12.5 11/30/2022    HGBA1C 6.9 (H) 12/21/2022    IRON 47 (L) 09/29/2021    TIBC 322 09/29/2021    FERRITIN 26.7 09/29/2021    HEPBSAG NON-REACTIVE 07/21/2021    TRIG 114 12/21/2022    CHOL 142 12/21/2022    HDL 48 12/21/2022    LDLCALC 72 12/21/2022    LABVLDL 23 12/21/2022    WVKGESMJ89UM 63.1 02/28/2023       Lab Results   Component Value Date    UPROTCREA 0.8 (H) 02/28/2023    UPROTCREA 0.7 (H) 02/22/2023    UPROTCREA 0.5 (H) 10/18/2022    EXTIRONSATUR 15 09/29/2021    EXTIRONSATUR 26 (H) 05/14/2021         Diagnosis:  Plan and Assessment:  1. Type 1 diabetes mellitus with nephropathy  Assessment & Plan:  Well controlled with reported HgbA1c of 6.9% on 12/21/22 - Insulin Pump - Continue Humalog - Farixga 10 mg po daily,     Orders:  -     Renal Function Panel; Future; Expected date:  06/03/2023  -     Protein/Creatinine Ratio, Urine; Future; Expected date: 06/03/2023    2. Hypertension with impaired renal function  Assessment & Plan:  At goal, Monitor BP, 2 Gram NA diet, Continue Amlodipine 10 mg po daily, Coreg 25 mg po BID, HCTZ 12.5 mg po daily, Lisinopril 20 mg po daily     Orders:  -     Renal Function Panel; Future; Expected date: 06/03/2023    3. Stage 3b chronic kidney disease  Assessment & Plan:  Serum Creatinine 2.19 / eGFR 33 (2.03 / eGFR 34 on 10/18/22) - without Anemia -  Avoid NSAIDS, Assure 64 oz of water daily, Meds per med list above     Orders:  -     Hemoglobin; Future; Expected date: 06/03/2023  -     Vitamin D; Future; Expected date: 06/03/2023  -     Renal Function Panel; Future; Expected date: 06/03/2023  -     PTH, Intact; Future; Expected date: 06/03/2023  -     Protein/Creatinine Ratio, Urine; Future; Expected date: 06/03/2023    4. Hyperkalemia  Assessment & Plan:  K 5.4 - up from 4.1 - Continue Lokelma 10 gr po daily     Orders:  -     Renal Function Panel; Future; Expected date: 06/03/2023    5. Secondary hyperparathyroidism of renal origin  Assessment & Plan:   - No indication for specialized Vitamin D at this time.     Orders:  -     Vitamin D; Future; Expected date: 06/03/2023  -     PTH, Intact; Future; Expected date: 06/03/2023    6. Non-nephrotic range proteinuria  Assessment & Plan:  800 mg - up from 700 - Continue ACE and SGLT2    Orders:  -     Protein/Creatinine Ratio, Urine; Future; Expected date: 06/03/2023    7. Hyperlipidemia, mixed  Assessment & Plan:  Continue Zetia 10 mg po daily, Crestor 20 mg po daily, Omega 3 fish oil              My reconciliation of medication should not condone or support use of medications that have been previously prescribed for patients by other providers.  I am only documenting the current medications patients is taking and may change doses, add or discontinue medications based on information provided by the patient.      The patient has been provided with their current level of kidney function including eGFR and creatinine.    We discussed the potential for common complications of CKD including anemia, electrolyte abnormalities, abnormal fluid balance, mineral bone disease and malnutrition.    We discussed strategies to slow the progression of their kidney disease including:  Avoid nephrotoxic agents. Avoid over-the-counter and prescription NSAIDs (Ibuprofren, Motrin, Naproxyn, Aleve, Mobic, Celebrex, Toradol, Advil). All of these can worsen kidney function, elevate BP, cause fluid retention/swelling and elevate potassium. Avoid iodine contrast agents and gadolinium, which can worsen kidney function and/or cause kidney failure. Avoid phosphosoda bowel preps which can worsen kidney function.  Work to improve modifiable risk factors. Aim for good control of blood glucose without episodes of hypoglycemia. Notify the provider managing your diabetes if your blood glucose < 60. Aim for good blood pressure control without episodes of hypotension. Call the office if your systolic blood pressure is consistently < 110. Aim for good control of your cholesterol.  AIC goal <7.0  BP goal <130/80        Keeping these in goal range will help prevent progression of cardiovascular disease            and chronic kidney disease.    We discussed dietary modifications:  Low sodium diet: 2 gm/d or less  Limit/avoid high potassium foods  Avoid potassium containing salt substitutes  Limit/avoid high phosphorus foods  Limit daily protein intake to 0.8-1 gm/kg of your ideal body weight.    We discussed lifestyle modifications:  Make sure you are drinking plenty of fluids--64 ounces (1/2 gallon) daily  Exercise at least 30 minutes 5 x per week (total 150 minutes per week), example brisk walking  Achieve and maintain a healthy weight (BMI 20-25)  Limit alcohol consumption to <2 drinks per day  Stop smoking  Make sure you stay current on important vaccines--  pneumonia vaccines (Pneumovax and Prevnar), flu vaccine, Hepatitis B (especially patients nearing renal replacement therapy and planning hemodialysis) and Covid-19 vaccine.     Recommendations:  Monitor your BP at home daily and record.  Bring readings to your next appt.  Call the office if your BP is persistently >130/80.  Seek urgent medical attention with signs and symptoms of uremia - extreme weakness, fatigue, confusion, anorexia, metallic taste in mouth, hiccoughs, cramping, itching, chest pain, swelling, or trouble sleeping.    Follow Up:   Follow up in about 3 months (around 6/3/2023).

## 2023-03-03 NOTE — ASSESSMENT & PLAN NOTE
Well controlled with reported HgbA1c of 6.9% on 12/21/22 - Insulin Pump - Continue Humalog - Farixga 10 mg po daily,

## 2023-03-09 NOTE — PROGRESS NOTES
Received Insurance denial for Lokelma stating it is not on the insurance formulary.  New Rx sent in for Veltassa 8.4 grams po daily.  Patient made aware.

## 2023-06-06 DIAGNOSIS — N18.4 CHRONIC KIDNEY DISEASE, STAGE IV (SEVERE): ICD-10-CM

## 2023-06-06 DIAGNOSIS — E87.5 HYPERKALEMIA: Primary | ICD-10-CM

## 2023-06-07 PROBLEM — N18.4 CHRONIC KIDNEY DISEASE, STAGE IV (SEVERE): Status: ACTIVE | Noted: 2022-10-19

## 2023-06-07 NOTE — ASSESSMENT & PLAN NOTE
Controlled with HgbA1c of 6.8% on 5/12/23 - Insulin Pump - Continue Humalog - Farixga 10 mg po daily,

## 2023-06-07 NOTE — PROGRESS NOTES
Pt Name:  Boston Strickland  Pt :  1960  Pt MRN:  [id number]    Date: 2023    Reason for visit:   Boston Strickland is a 62 y.o. c male presenting for CKD follow up with serum creatinine 2.47, eGFR 29 and Chronic Kidney Disease Stage IV.     Chief Complaint:   The patient denies any complaints today.    HPI:  The patient is being seen today for follow up CKD and the status of his kidney function. Since the last visit, patient reports no health changes.  The patient denies fatigue, weakness, poor appetite, metallic taste, nausea, cramping, chest pain, palpitations, SOB, orthopnea, PND, edema, trouble concentrating, decreased urination.      Home BPs when checked are <130/80 per patient. The patient is following a low sodium diet. The patient is avoiding NSAIDs. The patient is drinking 80 - 100 oz of water daily.     Since last visit on 3/3/23 patient reports no health changes in medical history.      History:   Past Medical History:   Diagnosis Date    Anemia in stage 3b chronic kidney disease 10/19/2022    Chronic kidney disease, unspecified     Coronary artery disease     Diverticulosis     DM type 1 (diabetes mellitus, type 1)     Essential hypertension 6/15/2022    External hemorrhoids     GERD (gastroesophageal reflux disease)     HTN (hypertension)     Hyperphosphatemia 6/15/2022    Mixed hyperlipidemia     Persistent proteinuria 6/15/2022    Personal history of colonic polyps     ST elevation (STEMI) myocardial infarction of unspecified site     Type 1 diabetes mellitus with stage 4 chronic kidney disease 6/15/2022    Unspecified cataract     Unspecified hemorrhoids      History reviewed. No pertinent surgical history.  Family History   Problem Relation Age of Onset    Hypertension Mother     Hypertension Father     Cancer Sister     Cancer Paternal Aunt      Social History     Substance and Sexual Activity   Alcohol Use Yes    Alcohol/week: 1.0 standard drink    Types: 1 Glasses of wine per week    Comment:  occational 1-2 weekly     Social History     Substance and Sexual Activity   Drug Use Never     Social History     Substance and Sexual Activity   Sexual Activity Yes    Partners: Female     reports being sexually active and has had partner(s) who are female.  Social History     Tobacco Use   Smoking Status Former   Smokeless Tobacco Never       Allergies:  Review of patient's allergies indicates:  No Known Allergies      Current Outpatient Medications:     amLODIPine (NORVASC) 10 MG tablet, Take 10 mg by mouth once daily., Disp: , Rfl:     aspirin (ECOTRIN) 81 MG EC tablet, Take 81 mg by mouth once daily., Disp: , Rfl:     carvediloL (COREG) 12.5 MG tablet, Take 12.5 mg by mouth 2 (two) times daily., Disp: , Rfl:     cholecalciferol, vitamin D3, 125 mcg (5,000 unit) Tab, Take 1 tablet (5,000 Units total) by mouth every other day., Disp: 30 tablet, Rfl: 11    clopidogreL (PLAVIX) 75 mg tablet, Take 75 mg by mouth once daily., Disp: , Rfl:     ezetimibe (ZETIA) 10 mg tablet, Take 10 mg by mouth once daily., Disp: , Rfl:     FARXIGA 10 mg tablet, Take 10 mg by mouth Daily., Disp: , Rfl:     fish oil/borage/flax/om3,6,9 1 (OMEGA 3-6-9 ORAL), Take 2 tablets by mouth Daily., Disp: , Rfl:     HUMALOG U-100 INSULIN 100 unit/mL injection, as needed. SS, Disp: , Rfl:     hydroCHLOROthiazide (HYDRODIURIL) 12.5 MG Tab, Take 12.5 mg by mouth once daily., Disp: , Rfl:     insulin aspart U-100 (NOVOLOG) 100 unit/mL injection, Takes on a SS when out of humalog, Disp: , Rfl:     lisinopriL (PRINIVIL,ZESTRIL) 20 MG tablet, Take 20 mg by mouth once daily., Disp: , Rfl:     magnesium oxide (MAG-OX) 400 mg (241.3 mg magnesium) tablet, Take 400 mg by mouth once daily., Disp: , Rfl:     multivitamin (THERAGRAN) per tablet, Take 1 tablet by mouth once daily., Disp: , Rfl:     patiromer calcium sorbitex (VELTASSA) 8.4 gram PwPk, Take 1 packet (8.4 g total) by mouth once daily., Disp: 90 packet, Rfl: 3    rosuvastatin (CRESTOR) 20 MG  "tablet, Take 20 mg by mouth once daily., Disp: , Rfl:     VITAMIN B COMPLEX ORAL, Take 1 tablet by mouth once daily., Disp: , Rfl:     ROS:  Review of Systems   Constitutional:  Negative for activity change, appetite change and fatigue.   Eyes:  Negative for visual disturbance.   Respiratory:  Negative for shortness of breath.    Cardiovascular:  Negative for chest pain, palpitations and leg swelling.   Gastrointestinal:  Negative for abdominal pain, constipation, nausea and vomiting.   Genitourinary:  Negative for decreased urine volume, difficulty urinating, dysuria, flank pain, frequency and urgency.   Musculoskeletal:  Negative for back pain and joint swelling.   Skin:  Negative for color change and rash.   Neurological:  Negative for dizziness, weakness, light-headedness and headaches.     Physical Exam:   Vitals:   Vitals:    06/09/23 1102   BP: 128/68   Pulse: 68   SpO2: 100%   Weight: 79.4 kg (175 lb)   Height: 5' 9" (1.753 m)     Body mass index is 25.84 kg/m².    Physical Exam  Vitals reviewed.   Constitutional:       General: He is awake. He is not in acute distress.  HENT:      Head: Normocephalic.      Mouth/Throat:      Mouth: Mucous membranes are moist.   Eyes:      General: No scleral icterus.     Conjunctiva/sclera: Conjunctivae normal.      Pupils: Pupils are equal, round, and reactive to light.   Cardiovascular:      Rate and Rhythm: Normal rate and regular rhythm.      Heart sounds: S1 normal and S2 normal. Murmur heard.   Systolic murmur is present with a grade of 2/6.   Pulmonary:      Effort: Pulmonary effort is normal.      Breath sounds: Normal breath sounds. No wheezing, rhonchi or rales.   Abdominal:      General: There is no distension.      Palpations: Abdomen is soft. There is no mass.   Musculoskeletal:         General: No swelling.      Right lower leg: No edema.      Left lower leg: No edema.   Skin:     General: Skin is warm and dry.      Nails: There is no clubbing.   Neurological: "      General: No focal deficit present.      Mental Status: He is alert and oriented to person, place, and time.   Psychiatric:         Mood and Affect: Mood and affect normal.         Thought Content: Thought content normal.       Labs/Tests:    RENAL FUNCTION PANEL  6/5/23     Ref Range & Units 2 d ago   Sodium 136 - 147 mmol/L 141    Potassium 3.5 - 5.1 mmol/L 5.6 High     Chloride 98 - 107 mmol/L 106    CO2 20 - 31 mmol/L 28    Glucose 70 - 99 mg/dL 106 High     BUN 9 - 23 mg/dL 44 High     Creatinine 0.70 - 1.30 mg/dL 2.47 High     Calcium 8.3 - 10.6 mg/dL 9.6    Phosphorus Level 2.4 - 5.1 mg/dL 5.3 High     Albumin Level 3.2 - 4.8 g/dL 4.4    Anion Gap 5.0 - 15.0 mmol/L 6.7    eGFR CKD-EPI >90 ml/min/1.73m2 29 Low       Contains abnormal data RENAL FUNCTION PANEL 6/9/23     Ref Range & Units 07:25   Sodium 136 - 147 mmol/L 138    Potassium 3.5 - 5.1 mmol/L 5.4 High     Chloride 98 - 107 mmol/L 105    CO2 20 - 31 mmol/L 25    Glucose 70 - 99 mg/dL 156 High     BUN 9 - 23 mg/dL 42 High     Creatinine 0.70 - 1.30 mg/dL 2.72 High     Calcium 8.3 - 10.6 mg/dL 9.7    Phosphorus Level 2.4 - 5.1 mg/dL 6.3 High     Albumin Level 3.2 - 4.8 g/dL 4.4    Anion Gap 5.0 - 15.0 mmol/L 8.3    eGFR CKD-EPI >90 ml/min/1.73m2 25 Low         Lab Results   Component Value Date    PTH 70 (H) 06/09/2023    HGB 13.1 06/09/2023    HGB 12.9 06/05/2023    HGB 12.5 02/28/2023    HGBA1C 6.8 (H) 05/12/2023    IRON 47 (L) 09/29/2021    TIBC 322 09/29/2021    FERRITIN 26.7 09/29/2021    HEPBSAG NON-REACTIVE 07/21/2021    TRIG 114 12/21/2022    CHOL 142 12/21/2022    HDL 48 12/21/2022    LDLCALC 72 12/21/2022    LABVLDL 23 12/21/2022    SGYDFUNG94YA 71.9 06/09/2023       Lab Results   Component Value Date    UPROTCREA 0.3 (H) 06/09/2023    UPROTCREA 0.6 (H) 06/05/2023    UPROTCREA 0.8 (H) 02/28/2023    EXTIRONSATUR 15 09/29/2021    EXTIRONSATUR 26 (H) 05/14/2021         Diagnosis:  Plan and Assessment:  1. Type 1 diabetes mellitus with  nephropathy  Assessment & Plan:  Controlled with HgbA1c of 6.8% on 5/12/23 - Insulin Pump - Continue Humalog - Farixga 10 mg po daily,     Orders:  -     Renal Function Panel; Future; Expected date: 09/09/2023  -     Protein/Creatinine Ratio, Urine; Future; Expected date: 09/09/2023    2. Hypertension with impaired renal function  Assessment & Plan:  At goal, Monitor BP, 2 Gram NA diet, Continue Amlodipine 10 mg po daily, Coreg 25 mg po BID, HCTZ 12.5 mg po daily, Lisinopril 20 mg po daily     Orders:  -     Renal Function Panel; Future; Expected date: 09/09/2023    3. Chronic kidney disease, stage IV (severe)  Assessment & Plan:  Serum Creatinine 2.47 / eGFR 29; (2.19 / eGFR 33 on 2/28/23 and 2.03 / eGFR 34 on 10/18/22) - without Anemia -  Avoid NSAIDS, Assure 64 oz of water daily, Meds per med list above     Orders:  -     Renal Function Panel; Future; Expected date: 06/23/2023  -     Hemoglobin; Future; Expected date: 09/09/2023  -     Vitamin D; Future; Expected date: 09/09/2023  -     Renal Function Panel; Future; Expected date: 09/09/2023  -     PTH, Intact; Future; Expected date: 09/09/2023  -     Protein/Creatinine Ratio, Urine; Future; Expected date: 09/09/2023    4. Hyperkalemia  Assessment & Plan:  K 5.6 -  Continue Veltassa 8.4 grams po daily - he reports he did miss a dose of his Medication.  Instructed on 6/5 to increase dose of Veltassa to BID x 2 days and have repeat labs prior to appointment on 6/9 with K 5.4 -     Will Increase Veltassa 8.4 grams to BID and repeat labs in 2 weeks     Orders:  -     Renal Function Panel; Future; Expected date: 06/23/2023  -     Renal Function Panel; Future; Expected date: 09/09/2023    5. Secondary hyperparathyroidism of renal origin  Assessment & Plan:  PTH 90 - No indication for specialized Vitamin D at this time.     Orders:  -     PTH, Intact; Future; Expected date: 09/09/2023    6. Non-nephrotic range proteinuria  Assessment & Plan:  600 mg - down from 800 mg -   Continue ACE and SGLT2    Orders:  -     Protein/Creatinine Ratio, Urine; Future; Expected date: 09/09/2023    7. Hyperlipidemia, mixed  Assessment & Plan:  Continue Zetia 10 mg po daily, Crestor 20 mg po daily, Omega 3 fish oil       8. Hyperphosphatemia  Assessment & Plan:  Phos 5.3 - Dietary counseling on foods high in phosphorus to avoid.   Repeat labs on 6/9 showed phos of 6.3 -     Will repeat labs in 2 weeks - if it remains elevated will start Renvela.     Orders:  -     Renal Function Panel; Future; Expected date: 06/23/2023  -     Renal Function Panel; Future; Expected date: 09/09/2023           My reconciliation of medication should not condone or support use of medications that have been previously prescribed for patients by other providers.  I am only documenting the current medications patients is taking and may change doses, add or discontinue medications based on information provided by the patient.     The patient has been provided with their current level of kidney function including eGFR and creatinine.    We discussed the potential for common complications of CKD including anemia, electrolyte abnormalities, abnormal fluid balance, mineral bone disease and malnutrition.    We discussed strategies to slow the progression of their kidney disease including:  Avoid nephrotoxic agents. Avoid over-the-counter and prescription NSAIDs (Ibuprofren, Motrin, Naproxyn, Aleve, Mobic, Celebrex, Toradol, Advil). All of these can worsen kidney function, elevate BP, cause fluid retention/swelling and elevate potassium. Avoid iodine contrast agents and gadolinium, which can worsen kidney function and/or cause kidney failure. Avoid phosphosoda bowel preps which can worsen kidney function.  Work to improve modifiable risk factors. Aim for good control of blood glucose without episodes of hypoglycemia. Notify the provider managing your diabetes if your blood glucose < 60. Aim for good blood pressure control without  episodes of hypotension. Call the office if your systolic blood pressure is consistently < 110. Aim for good control of your cholesterol.  AIC goal <7.0  BP goal <130/80        Keeping these in goal range will help prevent progression of cardiovascular disease            and chronic kidney disease.    We discussed dietary modifications:  Low sodium diet: 2 gm/d or less  Limit/avoid high potassium foods  Avoid potassium containing salt substitutes  Limit/avoid high phosphorus foods  Limit daily protein intake to 0.8-1 gm/kg of your ideal body weight.    We discussed lifestyle modifications:  Make sure you are drinking plenty of fluids--64 ounces (1/2 gallon) daily  Exercise at least 30 minutes 5 x per week (total 150 minutes per week), example brisk walking  Achieve and maintain a healthy weight (BMI 20-25)  Limit alcohol consumption to <2 drinks per day  Stop smoking  Make sure you stay current on important vaccines-- pneumonia vaccines (Pneumovax and Prevnar), flu vaccine, Hepatitis B (especially patients nearing renal replacement therapy and planning hemodialysis) and Covid-19 vaccine.     Recommendations:  Monitor your BP at home daily and record.  Bring readings to your next appt.  Call the office if your BP is persistently >130/80.  Seek urgent medical attention with signs and symptoms of uremia - extreme weakness, fatigue, confusion, anorexia, metallic taste in mouth, hiccoughs, cramping, itching, chest pain, swelling, or trouble sleeping.    Follow Up:   Follow up in about 3 months (around 9/9/2023).

## 2023-06-07 NOTE — ASSESSMENT & PLAN NOTE
Phos 5.3 - Dietary counseling on foods high in phosphorus to avoid.   Repeat labs on 6/9 showed phos of 6.3 -     Will repeat labs in 2 weeks - if it remains elevated will start Renvela.

## 2023-06-07 NOTE — ASSESSMENT & PLAN NOTE
Serum Creatinine 2.47 / eGFR 29; (2.19 / eGFR 33 on 2/28/23 and 2.03 / eGFR 34 on 10/18/22) - without Anemia -  Avoid NSAIDS, Assure 64 oz of water daily, Meds per med list above

## 2023-06-07 NOTE — ASSESSMENT & PLAN NOTE
STEVE 5.6 -  Continue Veltassa 8.4 grams po daily - he reports he did miss a dose of his Medication.  Instructed on 6/5 to increase dose of Veltassa to BID x 2 days and have repeat labs prior to appointment on 6/9 with STEVE 5.4 -     Will Increase Veltassa 8.4 grams to BID and repeat labs in 2 weeks

## 2023-06-09 ENCOUNTER — OFFICE VISIT (OUTPATIENT)
Dept: NEPHROLOGY | Facility: CLINIC | Age: 63
End: 2023-06-09
Payer: MEDICARE

## 2023-06-09 VITALS
BODY MASS INDEX: 25.92 KG/M2 | SYSTOLIC BLOOD PRESSURE: 128 MMHG | WEIGHT: 175 LBS | DIASTOLIC BLOOD PRESSURE: 68 MMHG | HEART RATE: 68 BPM | HEIGHT: 69 IN | OXYGEN SATURATION: 100 %

## 2023-06-09 DIAGNOSIS — N18.4 CHRONIC KIDNEY DISEASE, STAGE IV (SEVERE): ICD-10-CM

## 2023-06-09 DIAGNOSIS — N25.81 SECONDARY HYPERPARATHYROIDISM OF RENAL ORIGIN: ICD-10-CM

## 2023-06-09 DIAGNOSIS — E87.5 HYPERKALEMIA: ICD-10-CM

## 2023-06-09 DIAGNOSIS — I12.9 HYPERTENSION WITH IMPAIRED RENAL FUNCTION: ICD-10-CM

## 2023-06-09 DIAGNOSIS — E10.21 TYPE 1 DIABETES MELLITUS WITH NEPHROPATHY: ICD-10-CM

## 2023-06-09 DIAGNOSIS — R80.9 NON-NEPHROTIC RANGE PROTEINURIA: ICD-10-CM

## 2023-06-09 DIAGNOSIS — E83.39 HYPERPHOSPHATEMIA: ICD-10-CM

## 2023-06-09 DIAGNOSIS — E78.2 HYPERLIPIDEMIA, MIXED: ICD-10-CM

## 2023-06-09 PROCEDURE — 1160F RVW MEDS BY RX/DR IN RCRD: CPT | Mod: CPTII,S$GLB,, | Performed by: NURSE PRACTITIONER

## 2023-06-09 PROCEDURE — 1159F PR MEDICATION LIST DOCUMENTED IN MEDICAL RECORD: ICD-10-PCS | Mod: CPTII,S$GLB,, | Performed by: NURSE PRACTITIONER

## 2023-06-09 PROCEDURE — 3074F PR MOST RECENT SYSTOLIC BLOOD PRESSURE < 130 MM HG: ICD-10-PCS | Mod: CPTII,S$GLB,, | Performed by: NURSE PRACTITIONER

## 2023-06-09 PROCEDURE — 3074F SYST BP LT 130 MM HG: CPT | Mod: CPTII,S$GLB,, | Performed by: NURSE PRACTITIONER

## 2023-06-09 PROCEDURE — 99214 PR OFFICE/OUTPT VISIT, EST, LEVL IV, 30-39 MIN: ICD-10-PCS | Mod: S$GLB,,, | Performed by: NURSE PRACTITIONER

## 2023-06-09 PROCEDURE — 99214 OFFICE O/P EST MOD 30 MIN: CPT | Mod: S$GLB,,, | Performed by: NURSE PRACTITIONER

## 2023-06-09 PROCEDURE — 3066F NEPHROPATHY DOC TX: CPT | Mod: CPTII,S$GLB,, | Performed by: NURSE PRACTITIONER

## 2023-06-09 PROCEDURE — 3078F PR MOST RECENT DIASTOLIC BLOOD PRESSURE < 80 MM HG: ICD-10-PCS | Mod: CPTII,S$GLB,, | Performed by: NURSE PRACTITIONER

## 2023-06-09 PROCEDURE — 4010F PR ACE/ARB THEARPY RXD/TAKEN: ICD-10-PCS | Mod: CPTII,S$GLB,, | Performed by: NURSE PRACTITIONER

## 2023-06-09 PROCEDURE — 3008F BODY MASS INDEX DOCD: CPT | Mod: CPTII,S$GLB,, | Performed by: NURSE PRACTITIONER

## 2023-06-09 PROCEDURE — 1160F PR REVIEW ALL MEDS BY PRESCRIBER/CLIN PHARMACIST DOCUMENTED: ICD-10-PCS | Mod: CPTII,S$GLB,, | Performed by: NURSE PRACTITIONER

## 2023-06-09 PROCEDURE — 4010F ACE/ARB THERAPY RXD/TAKEN: CPT | Mod: CPTII,S$GLB,, | Performed by: NURSE PRACTITIONER

## 2023-06-09 PROCEDURE — 3008F PR BODY MASS INDEX (BMI) DOCUMENTED: ICD-10-PCS | Mod: CPTII,S$GLB,, | Performed by: NURSE PRACTITIONER

## 2023-06-09 PROCEDURE — 1159F MED LIST DOCD IN RCRD: CPT | Mod: CPTII,S$GLB,, | Performed by: NURSE PRACTITIONER

## 2023-06-09 PROCEDURE — 3078F DIAST BP <80 MM HG: CPT | Mod: CPTII,S$GLB,, | Performed by: NURSE PRACTITIONER

## 2023-06-09 PROCEDURE — 3066F PR DOCUMENTATION OF TREATMENT FOR NEPHROPATHY: ICD-10-PCS | Mod: CPTII,S$GLB,, | Performed by: NURSE PRACTITIONER

## 2023-06-23 ENCOUNTER — DOCUMENTATION ONLY (OUTPATIENT)
Dept: NEPHROLOGY | Facility: CLINIC | Age: 63
End: 2023-06-23
Payer: MEDICARE

## 2023-06-23 DIAGNOSIS — N18.4 CHRONIC KIDNEY DISEASE, STAGE IV (SEVERE): Primary | ICD-10-CM

## 2023-06-23 DIAGNOSIS — E87.5 HYPERKALEMIA: ICD-10-CM

## 2023-06-23 NOTE — PROGRESS NOTES
Patients repeat lab returned with K still 6.0; he is watching his diet and taking Veltassa 16.8 grams daily.     Advised to increase Veltassa to 25.2 grams daily for Saturday, Sunday and Monday and we will repeat labs on Tuesday 6/27/23.  He voices understanding.

## 2023-09-05 ENCOUNTER — TELEPHONE (OUTPATIENT)
Dept: NEPHROLOGY | Facility: CLINIC | Age: 63
End: 2023-09-05
Payer: MEDICARE

## 2023-09-05 NOTE — TELEPHONE ENCOUNTER
Rx refilled as requested     ----- Message from Oneida Cerna MA sent at 9/5/2023 11:17 AM CDT -----  Contact: pt [phone]    ----- Message -----  From: Ileana Zhang  Sent: 9/1/2023   3:59 PM CDT  To: Thierry Aldridge Staff    Type:  RX Refill Request    Who Called:  Pt   Refill or New Rx:  refill   RX Name and Strength:  patiromer calcium sorbitex (VELTASSA) 8.4 gram PwPk  How is the patient currently taking it? (ex. 1XDay):  Pt stated rx was changed from 1xday to 2xday   Is this a 30 day or 90 day RX:  90  Preferred Pharmacy with phone number:    [facility] - [place], [place] - [address]  [address] [address]  Phone: [phone] Fax: [phone]      Local or Mail Order:  Mail   Ordering Provider:  Thierry Caldwell Call Back Number:  [phone]    Additional Information:  Pt asking for alternative because Veltassa is too expensive

## 2023-09-05 NOTE — TELEPHONE ENCOUNTER
Zev requires a PA; Will provide samples to start.  Only other option is Kayexalate.      ----- Message from Saniya Buckley RN sent at 9/5/2023  5:00 PM CDT -----  Contact: pt [phone]  Pt asking for alternative because Veltassa is too expensive       ----- Message -----  From: Ileana Zhang  Sent: 9/1/2023   3:59 PM CDT  To: Thierry Aldridge Staff    Type:  RX Refill Request    Who Called:  Pt   Refill or New Rx:  refill   RX Name and Strength:  patiromer calcium sorbitex (VELTASSA) 8.4 gram PwPk  How is the patient currently taking it? (ex. 1XDay):  Pt stated rx was changed from 1xday to 2xday   Is this a 30 day or 90 day RX:  90  Preferred Pharmacy with phone number:    [facility] - [place], [place] - [address]  [address] [address]  Phone: [phone] Fax: [phone]      Local or Mail Order:  Mail   Ordering Provider:  Thierry Caldwell Call Back Number:  [phone]    Additional Information:  Pt asking for alternative because Veltassa is too expensive

## 2023-09-06 DIAGNOSIS — E87.5 HYPERKALEMIA: Primary | ICD-10-CM

## 2023-09-25 PROBLEM — N25.81 SECONDARY HYPERPARATHYROIDISM OF RENAL ORIGIN: Status: RESOLVED | Noted: 2022-10-19 | Resolved: 2023-09-25

## 2023-09-25 PROBLEM — E83.52 HYPERCALCEMIA: Status: ACTIVE | Noted: 2023-09-25

## 2023-09-25 NOTE — ASSESSMENT & PLAN NOTE
Serum Creatinine 2.58 / eGFR 27;  (2.47 / eGFR 29 on 6/9/23 and 2.19 / eGFR 33 on 2/28/23 and 2.03 / eGFR 34 on 10/18/22) - without Anemia - without SHPT -  Avoid NSAIDS, Assure 72 oz of water daily, Meds per med list above

## 2023-09-25 NOTE — ASSESSMENT & PLAN NOTE
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 Cisneros

## 2023-09-25 NOTE — PROGRESS NOTES
Pt Name:  Boston Strickland  Pt :  1960  Pt MRN:  [id number]    Date: 2023    Reason for visit:   Boston Strickland is a 63 y.o. c male presenting for CKD follow up with serum creatinine 2.58, eGFR 27 and Chronic Kidney Disease Stage IV.     Chief Complaint:   The patient denies any complaints today.    HPI:  The patient is being seen today for follow up CKD and the status of his kidney function. Since the last visit, patient reports no health changes.  The patient denies fatigue, weakness, poor appetite, metallic taste, nausea, cramping, chest pain, palpitations, SOB, orthopnea, PND, edema, trouble concentrating, decreased urination.      Home BPs when checked are averaging 115/60 - 70.  His BP is managed by Cardiology, Dr. Cisneros.  They have been decreasing his BP meds.  The patient is following a low sodium diet. The patient is avoiding NSAIDs. The patient is drinking 64 - 72 oz of water daily.     Since last visit on 23 patient reports no changes in medical history.      History:   Past Medical History:   Diagnosis Date    Anemia in stage 3b chronic kidney disease 10/19/2022    Chronic kidney disease, unspecified     Coronary artery disease     Diverticulosis     DM type 1 (diabetes mellitus, type 1)     Essential hypertension 6/15/2022    External hemorrhoids     GERD (gastroesophageal reflux disease)     HTN (hypertension)     Hyperphosphatemia 6/15/2022    Mixed hyperlipidemia     Persistent proteinuria 6/15/2022    Personal history of colonic polyps     Secondary hyperparathyroidism of renal origin 10/19/2022    ST elevation (STEMI) myocardial infarction of unspecified site     Type 1 diabetes mellitus with stage 4 chronic kidney disease 6/15/2022    Unspecified cataract     Unspecified hemorrhoids      History reviewed. No pertinent surgical history.  Family History   Problem Relation Age of Onset    Hypertension Mother     Hypertension Father     Cancer Sister     Cancer Paternal Aunt      Social  History     Substance and Sexual Activity   Alcohol Use Yes    Alcohol/week: 1.0 standard drink of alcohol    Types: 1 Glasses of wine per week    Comment: occational 1-2 weekly     Social History     Substance and Sexual Activity   Drug Use Never     Social History     Substance and Sexual Activity   Sexual Activity Yes    Partners: Female     reports being sexually active and has had partner(s) who are female.  Social History     Tobacco Use   Smoking Status Former   Smokeless Tobacco Never       Allergies:  Review of patient's allergies indicates:  No Known Allergies      Current Outpatient Medications:     amLODIPine (NORVASC) 10 MG tablet, Take 10 mg by mouth once daily., Disp: , Rfl:     aspirin (ECOTRIN) 81 MG EC tablet, Take 81 mg by mouth once daily., Disp: , Rfl:     carvediloL (COREG) 12.5 MG tablet, Take 12.5 mg by mouth once daily., Disp: , Rfl:     clopidogreL (PLAVIX) 75 mg tablet, Take 75 mg by mouth once daily., Disp: , Rfl:     ezetimibe (ZETIA) 10 mg tablet, Take 0.5 mg by mouth once daily., Disp: , Rfl:     FARXIGA 10 mg tablet, Take 10 mg by mouth Daily., Disp: , Rfl:     fish oil/borage/flax/om3,6,9 1 (OMEGA 3-6-9 ORAL), Take 2 tablets by mouth Daily., Disp: , Rfl:     HUMALOG U-100 INSULIN 100 unit/mL injection, as needed. SS, Disp: , Rfl:     insulin aspart U-100 (NOVOLOG) 100 unit/mL injection, Takes on a SS when out of humalog, Disp: , Rfl:     lisinopriL (PRINIVIL,ZESTRIL) 20 MG tablet, Take 20 mg by mouth once daily., Disp: , Rfl:     magnesium oxide (MAG-OX) 400 mg (241.3 mg magnesium) tablet, Take 400 mg by mouth once daily., Disp: , Rfl:     patiromer calcium sorbitex (VELTASSA) 8.4 gram PwPk, Take 2 packets (16.8 g total) by mouth once daily. (Patient taking differently: Take 24 g by mouth once daily.), Disp: 180 packet, Rfl: 3    rosuvastatin (CRESTOR) 20 MG tablet, Take 20 mg by mouth once daily., Disp: , Rfl:     VITAMIN B COMPLEX ORAL, Take 1 tablet by mouth once daily., Disp: ,  "Rfl:     ROS:  Review of Systems    Physical Exam:   Vitals:   Vitals:    09/27/23 1113 09/27/23 1114   BP: (!) 94/51 (!) 85/48   Pulse: 76    SpO2: 99%    Weight: 77.1 kg (170 lb)    Height: 5' 9" (1.753 m)      Body mass index is 25.1 kg/m².    Physical Exam  Vitals reviewed.   Constitutional:       General: He is awake. He is not in acute distress.  HENT:      Head: Normocephalic.      Mouth/Throat:      Mouth: Mucous membranes are moist.   Eyes:      General: No scleral icterus.     Conjunctiva/sclera: Conjunctivae normal.      Pupils: Pupils are equal, round, and reactive to light.   Cardiovascular:      Rate and Rhythm: Normal rate and regular rhythm.      Heart sounds: S1 normal and S2 normal. Murmur heard.      Systolic murmur is present with a grade of 2/6.   Pulmonary:      Effort: Pulmonary effort is normal.      Breath sounds: Normal breath sounds. No wheezing, rhonchi or rales.   Abdominal:      General: There is no distension.      Palpations: Abdomen is soft. There is no mass.   Musculoskeletal:         General: No swelling.      Right lower leg: No edema.      Left lower leg: No edema.   Skin:     General: Skin is warm and dry.      Nails: There is no clubbing.   Neurological:      General: No focal deficit present.      Mental Status: He is alert and oriented to person, place, and time.   Psychiatric:         Mood and Affect: Mood and affect normal.         Thought Content: Thought content normal.           Labs/Tests 9/22/23:      K 5.0  Cl 103  C02 27  Glu 146  BUN 45  Creat 2.58  CA 10.5  Phos 5.4  Alb 4.3  eGFR 27    PTH 38    Hgb 13    Vit D 65.1    Urine Prot/Creat 300 mg     Lab Results   Component Value Date    PTH 70 (H) 06/09/2023    HGB 13.1 06/09/2023    HGB 12.9 06/05/2023    HGB 12.5 02/28/2023    HGBA1C 6.8 (H) 05/12/2023    IRON 47 (L) 09/29/2021    TIBC 322 09/29/2021    FERRITIN 26.7 09/29/2021    HEPBSAG NON-REACTIVE 07/21/2021    TRIG 114 12/21/2022    CHOL 142 12/21/2022 "    HDL 48 12/21/2022    LDLCALC 72 12/21/2022    LABVLDL 23 12/21/2022    GNJCUQAM52FC 71.9 06/09/2023         Lab Results   Component Value Date    UPROTCREA 0.3 (H) 06/09/2023    UPROTCREA 0.6 (H) 06/05/2023    UPROTCREA 0.8 (H) 02/28/2023    EXTIRONSATUR 15 09/29/2021    EXTIRONSATUR 26 (H) 05/14/2021         Diagnosis:  Plan and Assessment:  1. Type 1 diabetes mellitus with nephropathy  Assessment & Plan:  Controlled with HgbA1c of 6.8% on 5/12/23 - Insulin Pump - Continue Humalog - Farixga 10 mg po daily,     Orders:  -     Renal Function Panel; Future; Expected date: 12/27/2023  -     Protein/Creatinine Ratio, Urine; Future; Expected date: 12/27/2023    2. Hypertension with impaired renal function  Assessment & Plan:  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 Cisneros    Orders:  -     Renal Function Panel; Future; Expected date: 12/27/2023    3. Chronic kidney disease, stage IV (severe)  Assessment & Plan:  Serum Creatinine 2.58 / eGFR 27;  (2.47 / eGFR 29 on 6/9/23 and 2.19 / eGFR 33 on 2/28/23 and 2.03 / eGFR 34 on 10/18/22) - without Anemia - without SHPT -  Avoid NSAIDS, Assure 72 oz of water daily, Meds per med list above     Orders:  -     Hemoglobin; Future; Expected date: 12/27/2023  -     Vitamin D; Future; Expected date: 12/27/2023  -     Renal Function Panel; Future; Expected date: 12/27/2023  -     PTH, Intact; Future; Expected date: 12/27/2023  -     Protein/Creatinine Ratio, Urine; Future; Expected date: 12/27/2023  -     Renal Function Panel; Future; Expected date: 10/06/2023    4. Hyperkalemia  Assessment & Plan:  K 5.0 -  Continue Veltassa 24 grams po daily -       Orders:  -     Renal Function Panel; Future; Expected date: 12/27/2023  -     Renal Function Panel; Future;  Expected date: 10/06/2023    5. Non-nephrotic range proteinuria  Assessment & Plan:  300 mg - down from 600 mg - down from 800 mg -  Continue ACE and SGLT2    Orders:  -     Protein/Creatinine Ratio, Urine; Future; Expected date: 12/27/2023    6. Hyperlipidemia, mixed  Assessment & Plan:  Continue Zetia 5 mg po daily, Crestor 20 mg po daily, Omega 3 fish oil       7. Hyperphosphatemia  Assessment & Plan:  Phos 5.4 - Dietary counseling on foods high in phosphorus to avoid.      Orders:  -     Renal Function Panel; Future; Expected date: 12/27/2023  -     Renal Function Panel; Future; Expected date: 10/06/2023    8. Hypercalcemia  Assessment & Plan:  CA 10.5 (albumin 4.3) - Stop Vitamin D3 (level is 65.1); stop multi vitamin; Stop HCTZ   -- repeat CA level in on 10/6     Orders:  -     Vitamin D; Future; Expected date: 12/27/2023  -     Renal Function Panel; Future; Expected date: 12/27/2023  -     Renal Function Panel; Future; Expected date: 10/06/2023           My reconciliation of medication should not condone or support use of medications that have been previously prescribed for patients by other providers.  I am only documenting the current medications patients is taking and may change doses, add or discontinue medications based on information provided by the patient.     The patient has been provided with their current level of kidney function including eGFR and creatinine.    We discussed the potential for common complications of CKD including anemia, electrolyte abnormalities, abnormal fluid balance, mineral bone disease and malnutrition.    We discussed strategies to slow the progression of their kidney disease including:  Avoid nephrotoxic agents. Avoid over-the-counter and prescription NSAIDs (Ibuprofren, Motrin, Naproxyn, Aleve, Mobic, Celebrex, Toradol, Advil). All of these can worsen kidney function, elevate BP, cause fluid retention/swelling and elevate potassium. Avoid iodine contrast agents and  gadolinium, which can worsen kidney function and/or cause kidney failure. Avoid phosphosoda bowel preps which can worsen kidney function.  Work to improve modifiable risk factors. Aim for good control of blood glucose without episodes of hypoglycemia. Notify the provider managing your diabetes if your blood glucose < 60. Aim for good blood pressure control without episodes of hypotension. Call the office if your systolic blood pressure is consistently < 110. Aim for good control of your cholesterol.  AIC goal <7.0  BP goal <130/80        Keeping these in goal range will help prevent progression of cardiovascular disease            and chronic kidney disease.    We discussed dietary modifications:  Low sodium diet: 2 gm/d or less  Limit/avoid high potassium foods  Avoid potassium containing salt substitutes  Limit/avoid high phosphorus foods  Limit daily protein intake to 0.8-1 gm/kg of your ideal body weight.    We discussed lifestyle modifications:  Make sure you are drinking plenty of fluids--64 ounces (1/2 gallon) daily  Exercise at least 30 minutes 5 x per week (total 150 minutes per week), example brisk walking  Achieve and maintain a healthy weight (BMI 20-25)  Limit alcohol consumption to <2 drinks per day  Stop smoking  Make sure you stay current on important vaccines-- pneumonia vaccines (Pneumovax and Prevnar), flu vaccine, Hepatitis B (especially patients nearing renal replacement therapy and planning hemodialysis) and Covid-19 vaccine.     Recommendations:  Monitor your BP at home daily and record.  Bring readings to your next appt.  Call the office if your BP is persistently >130/80.  Seek urgent medical attention with signs and symptoms of uremia - extreme weakness, fatigue, confusion, anorexia, metallic taste in mouth, hiccoughs, cramping, itching, chest pain, swelling, or trouble sleeping.    Follow Up:   Follow up in about 3 months (around 12/27/2023).

## 2023-09-25 NOTE — ASSESSMENT & PLAN NOTE
CA 10.5 (albumin 4.3) - Stop Vitamin D3 (level is 65.1); stop multi vitamin; Stop HCTZ   -- repeat CA level in on 10/6

## 2023-09-27 ENCOUNTER — OFFICE VISIT (OUTPATIENT)
Dept: NEPHROLOGY | Facility: CLINIC | Age: 63
End: 2023-09-27
Payer: MEDICARE

## 2023-09-27 VITALS
SYSTOLIC BLOOD PRESSURE: 85 MMHG | HEIGHT: 69 IN | WEIGHT: 170 LBS | BODY MASS INDEX: 25.18 KG/M2 | OXYGEN SATURATION: 99 % | DIASTOLIC BLOOD PRESSURE: 48 MMHG | HEART RATE: 76 BPM

## 2023-09-27 DIAGNOSIS — I12.9 HYPERTENSION WITH IMPAIRED RENAL FUNCTION: ICD-10-CM

## 2023-09-27 DIAGNOSIS — E78.2 HYPERLIPIDEMIA, MIXED: ICD-10-CM

## 2023-09-27 DIAGNOSIS — N18.4 CHRONIC KIDNEY DISEASE, STAGE IV (SEVERE): ICD-10-CM

## 2023-09-27 DIAGNOSIS — E87.5 HYPERKALEMIA: ICD-10-CM

## 2023-09-27 DIAGNOSIS — R80.9 NON-NEPHROTIC RANGE PROTEINURIA: ICD-10-CM

## 2023-09-27 DIAGNOSIS — E83.52 HYPERCALCEMIA: ICD-10-CM

## 2023-09-27 DIAGNOSIS — E10.21 TYPE 1 DIABETES MELLITUS WITH NEPHROPATHY: ICD-10-CM

## 2023-09-27 DIAGNOSIS — E83.39 HYPERPHOSPHATEMIA: ICD-10-CM

## 2023-09-27 PROCEDURE — 1160F RVW MEDS BY RX/DR IN RCRD: CPT | Mod: CPTII,S$GLB,, | Performed by: NURSE PRACTITIONER

## 2023-09-27 PROCEDURE — 1160F PR REVIEW ALL MEDS BY PRESCRIBER/CLIN PHARMACIST DOCUMENTED: ICD-10-PCS | Mod: CPTII,S$GLB,, | Performed by: NURSE PRACTITIONER

## 2023-09-27 PROCEDURE — 3074F PR MOST RECENT SYSTOLIC BLOOD PRESSURE < 130 MM HG: ICD-10-PCS | Mod: CPTII,S$GLB,, | Performed by: NURSE PRACTITIONER

## 2023-09-27 PROCEDURE — 99214 OFFICE O/P EST MOD 30 MIN: CPT | Mod: S$GLB,,, | Performed by: NURSE PRACTITIONER

## 2023-09-27 PROCEDURE — 3078F PR MOST RECENT DIASTOLIC BLOOD PRESSURE < 80 MM HG: ICD-10-PCS | Mod: CPTII,S$GLB,, | Performed by: NURSE PRACTITIONER

## 2023-09-27 PROCEDURE — 3066F NEPHROPATHY DOC TX: CPT | Mod: CPTII,S$GLB,, | Performed by: NURSE PRACTITIONER

## 2023-09-27 PROCEDURE — 1159F PR MEDICATION LIST DOCUMENTED IN MEDICAL RECORD: ICD-10-PCS | Mod: CPTII,S$GLB,, | Performed by: NURSE PRACTITIONER

## 2023-09-27 PROCEDURE — 4010F PR ACE/ARB THEARPY RXD/TAKEN: ICD-10-PCS | Mod: CPTII,S$GLB,, | Performed by: NURSE PRACTITIONER

## 2023-09-27 PROCEDURE — 3066F PR DOCUMENTATION OF TREATMENT FOR NEPHROPATHY: ICD-10-PCS | Mod: CPTII,S$GLB,, | Performed by: NURSE PRACTITIONER

## 2023-09-27 PROCEDURE — 3008F BODY MASS INDEX DOCD: CPT | Mod: CPTII,S$GLB,, | Performed by: NURSE PRACTITIONER

## 2023-09-27 PROCEDURE — 99214 PR OFFICE/OUTPT VISIT, EST, LEVL IV, 30-39 MIN: ICD-10-PCS | Mod: S$GLB,,, | Performed by: NURSE PRACTITIONER

## 2023-09-27 PROCEDURE — 1159F MED LIST DOCD IN RCRD: CPT | Mod: CPTII,S$GLB,, | Performed by: NURSE PRACTITIONER

## 2023-09-27 PROCEDURE — 3008F PR BODY MASS INDEX (BMI) DOCUMENTED: ICD-10-PCS | Mod: CPTII,S$GLB,, | Performed by: NURSE PRACTITIONER

## 2023-09-27 PROCEDURE — 3078F DIAST BP <80 MM HG: CPT | Mod: CPTII,S$GLB,, | Performed by: NURSE PRACTITIONER

## 2023-09-27 PROCEDURE — 4010F ACE/ARB THERAPY RXD/TAKEN: CPT | Mod: CPTII,S$GLB,, | Performed by: NURSE PRACTITIONER

## 2023-09-27 PROCEDURE — 3074F SYST BP LT 130 MM HG: CPT | Mod: CPTII,S$GLB,, | Performed by: NURSE PRACTITIONER
